# Patient Record
Sex: FEMALE | Race: WHITE | NOT HISPANIC OR LATINO | URBAN - METROPOLITAN AREA
[De-identification: names, ages, dates, MRNs, and addresses within clinical notes are randomized per-mention and may not be internally consistent; named-entity substitution may affect disease eponyms.]

---

## 2020-05-25 ENCOUNTER — EMERGENCY (EMERGENCY)
Facility: HOSPITAL | Age: 70
LOS: 0 days | Discharge: HOME | End: 2020-05-25
Attending: STUDENT IN AN ORGANIZED HEALTH CARE EDUCATION/TRAINING PROGRAM | Admitting: STUDENT IN AN ORGANIZED HEALTH CARE EDUCATION/TRAINING PROGRAM
Payer: MEDICARE

## 2020-05-25 VITALS
DIASTOLIC BLOOD PRESSURE: 79 MMHG | TEMPERATURE: 98 F | RESPIRATION RATE: 18 BRPM | SYSTOLIC BLOOD PRESSURE: 168 MMHG | HEART RATE: 65 BPM | OXYGEN SATURATION: 98 %

## 2020-05-25 DIAGNOSIS — S01.01XA LACERATION WITHOUT FOREIGN BODY OF SCALP, INITIAL ENCOUNTER: ICD-10-CM
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
## 2020-05-25 DIAGNOSIS — S09.90XA UNSPECIFIED INJURY OF HEAD, INITIAL ENCOUNTER: ICD-10-CM
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
## 2020-05-25 PROCEDURE — 12002 RPR S/N/AX/GEN/TRNK2.6-7.5CM: CPT

## 2020-05-25 PROCEDURE — 99283 EMERGENCY DEPT VISIT LOW MDM: CPT | Mod: 25

## 2020-05-25 NOTE — ED PROVIDER NOTE - PROVIDER TOKENS
FREE:[LAST:[Salvatore],FIRST:[Heriberto],PHONE:[(   )    -],FAX:[(   )    -],ESTABLISHEDPATIENT:[T]]

## 2020-05-25 NOTE — ED PROVIDER NOTE - OBJECTIVE STATEMENT
Pt is a 69 yo F no PMH presenting after she slipped on her steps and hit her head on the side of a brick wall. Denies any LOC, n/v, dizziness, blurred vision , numbness or tingling or weakness of the extremities. Not on any blood thinners. Took 2 advil prior to arrival.

## 2020-05-25 NOTE — ED PROVIDER NOTE - PATIENT PORTAL LINK FT
You can access the FollowMyHealth Patient Portal offered by [facility] by registering at the following website: [email or web address]. By joining [facility]’s FollowMyHealth portal, you will also be able to view your health information using other applications (apps) compatible with our system.

## 2020-05-25 NOTE — ED PROVIDER NOTE - NSFOLLOWUPINSTRUCTIONS_ED_ALL_ED_FT
RETURN IN 10 DAYS to REMOVE STAPLES    Laceration    A laceration is a cut that goes through all of the layers of the skin and into the tissue that is right under the skin. Some lacerations heal on their own. Others need to be closed with skin adhesive strips, skin glue, stitches (sutures), or staples. Proper laceration care minimizes the risk of infection and helps the laceration to heal better.  If non-absorbable stitches or staples have been placed, they must be taken out within the time frame instructed by your healthcare provider.    SEEK IMMEDIATE MEDICAL CARE IF YOU HAVE ANY OF THE FOLLOWING SYMPTOMS: swelling around the wound, worsening pain, drainage from the wound, red streaking going away from your wound, inability to move finger or toe near the laceration, or discoloration of skin near the laceration.

## 2020-05-25 NOTE — ED PROVIDER NOTE - CLINICAL SUMMARY MEDICAL DECISION MAKING FREE TEXT BOX
pt doing well after ED obs period, no change in mental status or neuro exam. lac repaired. stable for d/c and f/u. return precautions given.

## 2020-05-25 NOTE — ED PROVIDER NOTE - PHYSICAL EXAMINATION
CONSTITUTIONAL: Well-developed; well-nourished; in no acute distress.   SKIN: warm, dry  HEAD: + 4.5 cm laceration to the occipital scalp. No active bleeding.   EYES: PERRL, EOMI, normal sclera and conjunctiva   ENT: No nasal discharge; airway clear.  NECK: Supple; non tender.  CARD:  Regular rate and rhythm.   RESP: NO inc WOB   ABD: soft ntnd  EXT: Normal ROM.    LYMPH: No acute cervical adenopathy.  NEURO: Alert, oriented, grossly unremarkable  PSYCH: Cooperative, appropriate.

## 2021-09-03 ENCOUNTER — INPATIENT (INPATIENT)
Facility: HOSPITAL | Age: 71
LOS: 5 days | Discharge: HOME | End: 2021-09-09
Attending: INTERNAL MEDICINE | Admitting: INTERNAL MEDICINE
Payer: MEDICARE

## 2021-09-03 VITALS
TEMPERATURE: 97 F | OXYGEN SATURATION: 96 % | HEART RATE: 68 BPM | SYSTOLIC BLOOD PRESSURE: 126 MMHG | DIASTOLIC BLOOD PRESSURE: 79 MMHG | RESPIRATION RATE: 20 BRPM | WEIGHT: 189.6 LBS

## 2021-09-03 LAB
ALBUMIN SERPL ELPH-MCNC: 4.4 G/DL — SIGNIFICANT CHANGE UP (ref 3.5–5.2)
ALP SERPL-CCNC: 574 U/L — HIGH (ref 30–115)
ALT FLD-CCNC: 449 U/L — HIGH (ref 0–41)
ANION GAP SERPL CALC-SCNC: 15 MMOL/L — HIGH (ref 7–14)
APPEARANCE UR: CLEAR — SIGNIFICANT CHANGE UP
AST SERPL-CCNC: 350 U/L — HIGH (ref 0–41)
B-OH-BUTYR SERPL-SCNC: 1 MMOL/L — HIGH
BASOPHILS # BLD AUTO: 0.03 K/UL — SIGNIFICANT CHANGE UP (ref 0–0.2)
BASOPHILS NFR BLD AUTO: 0.3 % — SIGNIFICANT CHANGE UP (ref 0–1)
BILIRUB DIRECT SERPL-MCNC: 1.7 MG/DL — HIGH (ref 0–0.2)
BILIRUB INDIRECT FLD-MCNC: 1.1 MG/DL — SIGNIFICANT CHANGE UP (ref 0.2–1.2)
BILIRUB SERPL-MCNC: 2.8 MG/DL — HIGH (ref 0.2–1.2)
BILIRUB UR-MCNC: NEGATIVE — SIGNIFICANT CHANGE UP
BUN SERPL-MCNC: 18 MG/DL — SIGNIFICANT CHANGE UP (ref 10–20)
CALCIUM SERPL-MCNC: 9.8 MG/DL — SIGNIFICANT CHANGE UP (ref 8.5–10.1)
CHLORIDE SERPL-SCNC: 96 MMOL/L — LOW (ref 98–110)
CO2 SERPL-SCNC: 25 MMOL/L — SIGNIFICANT CHANGE UP (ref 17–32)
COLOR SPEC: YELLOW — SIGNIFICANT CHANGE UP
CREAT SERPL-MCNC: 0.6 MG/DL — LOW (ref 0.7–1.5)
DIFF PNL FLD: NEGATIVE — SIGNIFICANT CHANGE UP
EOSINOPHIL # BLD AUTO: 0.02 K/UL — SIGNIFICANT CHANGE UP (ref 0–0.7)
EOSINOPHIL NFR BLD AUTO: 0.2 % — SIGNIFICANT CHANGE UP (ref 0–8)
GAS PNL BLDV: SIGNIFICANT CHANGE UP
GLUCOSE SERPL-MCNC: 301 MG/DL — HIGH (ref 70–99)
GLUCOSE UR QL: ABNORMAL
HCT VFR BLD CALC: 47.5 % — HIGH (ref 37–47)
HGB BLD-MCNC: 15.6 G/DL — SIGNIFICANT CHANGE UP (ref 12–16)
IMM GRANULOCYTES NFR BLD AUTO: 0.3 % — SIGNIFICANT CHANGE UP (ref 0.1–0.3)
KETONES UR-MCNC: ABNORMAL
LACTATE SERPL-SCNC: 2.4 MMOL/L — HIGH (ref 0.7–2)
LEUKOCYTE ESTERASE UR-ACNC: NEGATIVE — SIGNIFICANT CHANGE UP
LIDOCAIN IGE QN: 28 U/L — SIGNIFICANT CHANGE UP (ref 7–60)
LYMPHOCYTES # BLD AUTO: 1.78 K/UL — SIGNIFICANT CHANGE UP (ref 1.2–3.4)
LYMPHOCYTES # BLD AUTO: 18 % — LOW (ref 20.5–51.1)
MCHC RBC-ENTMCNC: 28.9 PG — SIGNIFICANT CHANGE UP (ref 27–31)
MCHC RBC-ENTMCNC: 32.8 G/DL — SIGNIFICANT CHANGE UP (ref 32–37)
MCV RBC AUTO: 88 FL — SIGNIFICANT CHANGE UP (ref 81–99)
MONOCYTES # BLD AUTO: 0.52 K/UL — SIGNIFICANT CHANGE UP (ref 0.1–0.6)
MONOCYTES NFR BLD AUTO: 5.3 % — SIGNIFICANT CHANGE UP (ref 1.7–9.3)
NEUTROPHILS # BLD AUTO: 7.5 K/UL — HIGH (ref 1.4–6.5)
NEUTROPHILS NFR BLD AUTO: 75.9 % — HIGH (ref 42.2–75.2)
NITRITE UR-MCNC: NEGATIVE — SIGNIFICANT CHANGE UP
NRBC # BLD: 0 /100 WBCS — SIGNIFICANT CHANGE UP (ref 0–0)
PH UR: 6 — SIGNIFICANT CHANGE UP (ref 5–8)
PLATELET # BLD AUTO: 282 K/UL — SIGNIFICANT CHANGE UP (ref 130–400)
POTASSIUM SERPL-MCNC: 5.1 MMOL/L — HIGH (ref 3.5–5)
POTASSIUM SERPL-SCNC: 5.1 MMOL/L — HIGH (ref 3.5–5)
PROT SERPL-MCNC: 7.6 G/DL — SIGNIFICANT CHANGE UP (ref 6–8)
PROT UR-MCNC: SIGNIFICANT CHANGE UP
RBC # BLD: 5.4 M/UL — SIGNIFICANT CHANGE UP (ref 4.2–5.4)
RBC # FLD: 13.8 % — SIGNIFICANT CHANGE UP (ref 11.5–14.5)
SARS-COV-2 RNA SPEC QL NAA+PROBE: SIGNIFICANT CHANGE UP
SODIUM SERPL-SCNC: 136 MMOL/L — SIGNIFICANT CHANGE UP (ref 135–146)
SP GR SPEC: 1.04 — HIGH (ref 1.01–1.03)
TROPONIN T SERPL-MCNC: <0.01 NG/ML — SIGNIFICANT CHANGE UP
UROBILINOGEN FLD QL: ABNORMAL
WBC # BLD: 9.88 K/UL — SIGNIFICANT CHANGE UP (ref 4.8–10.8)
WBC # FLD AUTO: 9.88 K/UL — SIGNIFICANT CHANGE UP (ref 4.8–10.8)

## 2021-09-03 PROCEDURE — 71275 CT ANGIOGRAPHY CHEST: CPT | Mod: 26,MA

## 2021-09-03 PROCEDURE — 99291 CRITICAL CARE FIRST HOUR: CPT

## 2021-09-03 PROCEDURE — 74174 CTA ABD&PLVS W/CONTRAST: CPT | Mod: 26,MH

## 2021-09-03 PROCEDURE — 93010 ELECTROCARDIOGRAM REPORT: CPT

## 2021-09-03 RX ORDER — CALCIUM GLUCONATE 100 MG/ML
1 VIAL (ML) INTRAVENOUS ONCE
Refills: 0 | Status: COMPLETED | OUTPATIENT
Start: 2021-09-03 | End: 2021-09-03

## 2021-09-03 RX ORDER — SODIUM CHLORIDE 9 MG/ML
1000 INJECTION, SOLUTION INTRAVENOUS ONCE
Refills: 0 | Status: COMPLETED | OUTPATIENT
Start: 2021-09-03 | End: 2021-09-03

## 2021-09-03 RX ORDER — FAMOTIDINE 10 MG/ML
20 INJECTION INTRAVENOUS ONCE
Refills: 0 | Status: COMPLETED | OUTPATIENT
Start: 2021-09-03 | End: 2021-09-03

## 2021-09-03 RX ORDER — MORPHINE SULFATE 50 MG/1
4 CAPSULE, EXTENDED RELEASE ORAL ONCE
Refills: 0 | Status: DISCONTINUED | OUTPATIENT
Start: 2021-09-03 | End: 2021-09-03

## 2021-09-03 RX ORDER — SODIUM CHLORIDE 9 MG/ML
1000 INJECTION, SOLUTION INTRAVENOUS
Refills: 0 | Status: DISCONTINUED | OUTPATIENT
Start: 2021-09-03 | End: 2021-09-04

## 2021-09-03 RX ORDER — METOCLOPRAMIDE HCL 10 MG
10 TABLET ORAL ONCE
Refills: 0 | Status: COMPLETED | OUTPATIENT
Start: 2021-09-03 | End: 2021-09-03

## 2021-09-03 RX ORDER — ONDANSETRON 8 MG/1
4 TABLET, FILM COATED ORAL ONCE
Refills: 0 | Status: COMPLETED | OUTPATIENT
Start: 2021-09-03 | End: 2021-09-03

## 2021-09-03 RX ORDER — INSULIN HUMAN 100 [IU]/ML
10 INJECTION, SOLUTION SUBCUTANEOUS ONCE
Refills: 0 | Status: COMPLETED | OUTPATIENT
Start: 2021-09-03 | End: 2021-09-03

## 2021-09-03 RX ADMIN — SODIUM CHLORIDE 1000 MILLILITER(S): 9 INJECTION, SOLUTION INTRAVENOUS at 18:16

## 2021-09-03 RX ADMIN — SODIUM CHLORIDE 1000 MILLILITER(S): 9 INJECTION, SOLUTION INTRAVENOUS at 19:42

## 2021-09-03 RX ADMIN — Medication 104 MILLIGRAM(S): at 19:41

## 2021-09-03 RX ADMIN — MORPHINE SULFATE 4 MILLIGRAM(S): 50 CAPSULE, EXTENDED RELEASE ORAL at 20:37

## 2021-09-03 RX ADMIN — MORPHINE SULFATE 4 MILLIGRAM(S): 50 CAPSULE, EXTENDED RELEASE ORAL at 19:41

## 2021-09-03 RX ADMIN — Medication 100 GRAM(S): at 23:30

## 2021-09-03 RX ADMIN — MORPHINE SULFATE 4 MILLIGRAM(S): 50 CAPSULE, EXTENDED RELEASE ORAL at 20:39

## 2021-09-03 RX ADMIN — FAMOTIDINE 104 MILLIGRAM(S): 10 INJECTION INTRAVENOUS at 18:16

## 2021-09-03 RX ADMIN — SODIUM CHLORIDE 1000 MILLILITER(S): 9 INJECTION, SOLUTION INTRAVENOUS at 19:55

## 2021-09-03 RX ADMIN — Medication 1 GRAM(S): at 23:59

## 2021-09-03 RX ADMIN — SODIUM CHLORIDE 1000 MILLILITER(S): 9 INJECTION, SOLUTION INTRAVENOUS at 19:54

## 2021-09-03 RX ADMIN — Medication 10 MILLIGRAM(S): at 19:54

## 2021-09-03 RX ADMIN — ONDANSETRON 4 MILLIGRAM(S): 8 TABLET, FILM COATED ORAL at 18:16

## 2021-09-03 RX ADMIN — INSULIN HUMAN 10 UNIT(S): 100 INJECTION, SOLUTION SUBCUTANEOUS at 23:30

## 2021-09-03 RX ADMIN — FAMOTIDINE 20 MILLIGRAM(S): 10 INJECTION INTRAVENOUS at 19:39

## 2021-09-03 RX ADMIN — MORPHINE SULFATE 4 MILLIGRAM(S): 50 CAPSULE, EXTENDED RELEASE ORAL at 18:16

## 2021-09-03 RX ADMIN — SODIUM CHLORIDE 1000 MILLILITER(S): 9 INJECTION, SOLUTION INTRAVENOUS at 19:39

## 2021-09-03 NOTE — ED PROVIDER NOTE - NS ED ROS FT
Constitutional: (-) fever (-) chills (-) lightheadedness  (+) diaphroesis  Eyes/ENT: (-) blurry vision, (-) epistaxis (-) rhinorrhea (-) nasal congestion  Cardiovascular: (-) chest pain, (-) syncope (-) palpitations   Respiratory: (-) cough, (-) shortness of breath (-) pleurisy   Gastrointestinal: (+) vomiting, (-) diarrhea (+) abdominal pain (+) nausea (-) anorexia  Musculoskeletal: (-) neck pain, (-) back pain, (-) joint pain (-) joint swelling (-) painful ROM  Integumentary: (-) rash, (-) edema (-) lacerations (-) pruritis   Neurological: (-) headache, (-) altered mental status (-) LOC (-) dizziness (-) paresthesias (-) gait abnormalities

## 2021-09-03 NOTE — ED PROVIDER NOTE - ATTENDING CONTRIBUTION TO CARE
70 y/o f w/ pmhx of hypothyroidism not complaint with her synthroid, has not follow up with her physician in year, cholecystectomy many years ago, GERD, supposed to be on anti acid medication but does not take, presents with ~ 3 days of epigastric pain, acid taste in mouth at time, sharp, radiated to lower back on L side, associated with nausea and vomiting, nbnb, diaphoresis. pt has never gotten and endoscopy or colonoscopy. No fever, chills, cp,  pleuritic cp, sob, palpitations, cough, ha/lh/dizziness, numbness/tingling, neck pain/ stiffness, abd pain, constipation, melena/brbpr, urinary symptoms, vaginal bleeding/discharge/odor, trauma, weakness, edema, calf pain/swelling/erythema, sick contacts, recent travel or rash. Not a smoker, no etoh abuse or drug abuse.    On Exam: Vital Signs: I have reviewed the initial vital signs. Constitutional: Pt sitting on stretcher speaking full sentences intermittently with dry heaves. Integumentary: No rash. No jaundice. EYES: No sclera Icterus. ENT: MMM NECK: Supple, non-tender, no meningeal signs. Cardiovascular: RRR, radial pulses 2/4 b/l. No JVD. Respiratory: BS present b/l, ctabl, no wheezing or crackles, no accessory muscle use, no stridor. Gastrointestinal: BS present throughout all 4 quadrants, soft, nd, pain to palpation to epigastric region, no rebound tenderness or guarding, no cvat. (-) Tamayo's (-) Rovsing (-) Obturator (-) Psoas, Musculoskeletal: FROM, no edema, no calf pain/swelling/erythema. Neurologic: AAOx3, motor 5/5 and sensation intact throughout upper and lowe ext, CN II-XII intact, No facial droop or slurring of speech. No focal deficits.

## 2021-09-03 NOTE — ED PROVIDER NOTE - CLINICAL SUMMARY MEDICAL DECISION MAKING FREE TEXT BOX
I have fully discussed the medical management and delivery of care with the patient. I have discussed any available labs, imaging and treatment options with the patient.  Pt admitted for further care & management.  ICU team aware of pt as approved for ICU.

## 2021-09-03 NOTE — ED PROVIDER NOTE - PHYSICAL EXAMINATION
Physical Exam    Vital Signs: I have reviewed the initial vital signs.  Constitutional: well-nourished, appears stated age, no acute distress  Eyes: Conjunctiva pink, Sclera clear, PERRLA, EOMI  Cardiovascular: S1 and S2, regular rate, regular rhythm, well-perfused extremities, radial pulses equal and 2+, calves nonttp, equal in size  Respiratory: unlabored respiratory effort, speaking in full sentences, handling oral secretions, clear to auscultation bilaterally no wheezing, rales and rhonchi  Gastrointestinal: soft, TTP ipigastrium, no pulsatile mass, normal bowl sounds  Musculoskeletal: supple neck, no lower extremity edema, no midline tenderness, paraspinal tenderness, clavicular creptius, painful rom, moving all extreities appropriately, no gross bony deformities or swelling.  Integumentary: warm, dry, no rashes, lacerations,  Neurologic: awake, alert, Physical Exam    Vital Signs: I have reviewed the initial vital signs.  Constitutional: well-nourished, appears stated age, no acute distress  Eyes: Conjunctiva pink, Sclera clear, PERRLA, EOMI  Cardiovascular: S1 and S2, regular rate, regular rhythm, well-perfused extremities, radial pulses equal and 2+, calves nonttp, equal in size  Respiratory: unlabored respiratory effort, speaking in full sentences, handling oral secretions, clear to auscultation bilaterally no wheezing, rales and rhonchi  Gastrointestinal: soft, TTP epigastrium, no pulsatile mass, normal bowl sounds  Musculoskeletal: supple neck, no lower extremity edema, no midline tenderness, paraspinal tenderness, clavicular crepitus, painful rom, moving all extremities appropriately, no gross bony deformities or swelling.  Integumentary: warm, dry, no rashes, lacerations,  Neurologic: awake, alert,

## 2021-09-03 NOTE — ED ADULT NURSE REASSESSMENT NOTE - NS ED NURSE REASSESS COMMENT FT1
Assumed care from previous day shift RN c/o abdominal pain , nausea, vomiting , AO x 4 , no SOB , denies chest pain , denies abdominal pain , denies dizziness , awaiting for CT abdomen

## 2021-09-03 NOTE — ED PROVIDER NOTE - CARE PLAN
Assessment and plan of treatment:	Plan: Monitor, EKG, CXR, labs, urine, ivf, pain meds, anit emetic, pepcid, reassess.   1 Principal Discharge DX:	DKA (diabetic ketoacidosis)  Assessment and plan of treatment:	Plan: Monitor, EKG, CXR, labs, urine, ivf, pain meds, anit emetic, pepcid, reassess.

## 2021-09-03 NOTE — ED PROVIDER NOTE - PROGRESS NOTE DETAILS
ED Attending MARCO Lind  Pt and family at bedside aware of plan, pt received meds, nausea currently controlled, eg reviewed, will continue to monitor and reassess. dc: pt likely in dka although no previous hx of dm. Will treat accordingly. Pt going for CT abdomen pelvis ED Attending MARCO Lind  Pt and family at bedside aware of plan, pt received meds, nausea currently controlled, ekg reviewed, will continue to monitor and reassess. ED Attending MARCO Lind  glucose 301, ag 15, beta hydrocy 1, pt has not seen physician does not know if she has DM, fludis given, insulin ordered. pending imaging results, repeat labs, will reassess. ED Attending MARCO Lind  pt and family aware of all results, pt reports hx of gestational dm but never followed up and is not sure if she has dm as she has not seen hy physician in years, pt feeling better at this time, vomiting controlled, received bolus of insulin, k repeated after on vbg and is 4.2, Ag on repeat cmp after boluis still elevated, vbg noted, drip started, ICU fellow approved pt for ICU< ICU resident aware of pt and admission as discussed and agreed with pt. fluids being given, insulin drip to be started, repeat labs that ICU team will follow . ED Attending MARCO Lind  glucose 301, ag 15, beta hydroxy 1, pt has not seen physician does not know if she has DM, fluids given, insulin ordered. pending imaging results, repeat labs, will reassess.

## 2021-09-04 LAB
A1C WITH ESTIMATED AVERAGE GLUCOSE RESULT: 8.7 % — HIGH (ref 4–5.6)
ALBUMIN SERPL ELPH-MCNC: 4 G/DL — SIGNIFICANT CHANGE UP (ref 3.5–5.2)
ALBUMIN SERPL ELPH-MCNC: 4.1 G/DL — SIGNIFICANT CHANGE UP (ref 3.5–5.2)
ALP SERPL-CCNC: 469 U/L — HIGH (ref 30–115)
ALP SERPL-CCNC: 480 U/L — HIGH (ref 30–115)
ALT FLD-CCNC: 414 U/L — HIGH (ref 0–41)
ALT FLD-CCNC: 536 U/L — HIGH (ref 0–41)
ANION GAP SERPL CALC-SCNC: 11 MMOL/L — SIGNIFICANT CHANGE UP (ref 7–14)
ANION GAP SERPL CALC-SCNC: 13 MMOL/L — SIGNIFICANT CHANGE UP (ref 7–14)
ANION GAP SERPL CALC-SCNC: 14 MMOL/L — SIGNIFICANT CHANGE UP (ref 7–14)
ANION GAP SERPL CALC-SCNC: 21 MMOL/L — HIGH (ref 7–14)
AST SERPL-CCNC: 335 U/L — HIGH (ref 0–41)
AST SERPL-CCNC: 417 U/L — HIGH (ref 0–41)
BASE EXCESS BLDV CALC-SCNC: 0.4 MMOL/L — SIGNIFICANT CHANGE UP (ref -2–3)
BASOPHILS # BLD AUTO: 0.01 K/UL — SIGNIFICANT CHANGE UP (ref 0–0.2)
BASOPHILS NFR BLD AUTO: 0.1 % — SIGNIFICANT CHANGE UP (ref 0–1)
BILIRUB SERPL-MCNC: 3.1 MG/DL — HIGH (ref 0.2–1.2)
BILIRUB SERPL-MCNC: 3.9 MG/DL — HIGH (ref 0.2–1.2)
BUN SERPL-MCNC: 10 MG/DL — SIGNIFICANT CHANGE UP (ref 10–20)
BUN SERPL-MCNC: 10 MG/DL — SIGNIFICANT CHANGE UP (ref 10–20)
BUN SERPL-MCNC: 16 MG/DL — SIGNIFICANT CHANGE UP (ref 10–20)
BUN SERPL-MCNC: 9 MG/DL — LOW (ref 10–20)
CA-I SERPL-SCNC: 1.12 MMOL/L — LOW (ref 1.15–1.33)
CALCIUM SERPL-MCNC: 8.7 MG/DL — SIGNIFICANT CHANGE UP (ref 8.5–10.1)
CALCIUM SERPL-MCNC: 8.9 MG/DL — SIGNIFICANT CHANGE UP (ref 8.5–10.1)
CALCIUM SERPL-MCNC: 9.1 MG/DL — SIGNIFICANT CHANGE UP (ref 8.5–10.1)
CALCIUM SERPL-MCNC: 9.3 MG/DL — SIGNIFICANT CHANGE UP (ref 8.5–10.1)
CHLORIDE SERPL-SCNC: 100 MMOL/L — SIGNIFICANT CHANGE UP (ref 98–110)
CHLORIDE SERPL-SCNC: 96 MMOL/L — LOW (ref 98–110)
CHLORIDE SERPL-SCNC: 97 MMOL/L — LOW (ref 98–110)
CHLORIDE SERPL-SCNC: 99 MMOL/L — SIGNIFICANT CHANGE UP (ref 98–110)
CHOLEST SERPL-MCNC: 253 MG/DL — HIGH
CO2 SERPL-SCNC: 14 MMOL/L — LOW (ref 17–32)
CO2 SERPL-SCNC: 21 MMOL/L — SIGNIFICANT CHANGE UP (ref 17–32)
CO2 SERPL-SCNC: 25 MMOL/L — SIGNIFICANT CHANGE UP (ref 17–32)
CO2 SERPL-SCNC: 25 MMOL/L — SIGNIFICANT CHANGE UP (ref 17–32)
CREAT SERPL-MCNC: 0.5 MG/DL — LOW (ref 0.7–1.5)
CREAT SERPL-MCNC: 0.5 MG/DL — LOW (ref 0.7–1.5)
CREAT SERPL-MCNC: 0.6 MG/DL — LOW (ref 0.7–1.5)
CREAT SERPL-MCNC: 0.7 MG/DL — SIGNIFICANT CHANGE UP (ref 0.7–1.5)
EOSINOPHIL # BLD AUTO: 0 K/UL — SIGNIFICANT CHANGE UP (ref 0–0.7)
EOSINOPHIL NFR BLD AUTO: 0 % — SIGNIFICANT CHANGE UP (ref 0–8)
ESTIMATED AVERAGE GLUCOSE: 203 MG/DL — HIGH (ref 68–114)
GAS PNL BLDV: 131 MMOL/L — LOW (ref 136–145)
GAS PNL BLDV: SIGNIFICANT CHANGE UP
GLUCOSE BLDC GLUCOMTR-MCNC: 108 MG/DL — HIGH (ref 70–99)
GLUCOSE BLDC GLUCOMTR-MCNC: 121 MG/DL — HIGH (ref 70–99)
GLUCOSE BLDC GLUCOMTR-MCNC: 144 MG/DL — HIGH (ref 70–99)
GLUCOSE BLDC GLUCOMTR-MCNC: 148 MG/DL — HIGH (ref 70–99)
GLUCOSE BLDC GLUCOMTR-MCNC: 163 MG/DL — HIGH (ref 70–99)
GLUCOSE BLDC GLUCOMTR-MCNC: 173 MG/DL — HIGH (ref 70–99)
GLUCOSE BLDC GLUCOMTR-MCNC: 174 MG/DL — HIGH (ref 70–99)
GLUCOSE BLDC GLUCOMTR-MCNC: 176 MG/DL — HIGH (ref 70–99)
GLUCOSE BLDC GLUCOMTR-MCNC: 178 MG/DL — HIGH (ref 70–99)
GLUCOSE BLDC GLUCOMTR-MCNC: 184 MG/DL — HIGH (ref 70–99)
GLUCOSE BLDC GLUCOMTR-MCNC: 192 MG/DL — HIGH (ref 70–99)
GLUCOSE SERPL-MCNC: 144 MG/DL — HIGH (ref 70–99)
GLUCOSE SERPL-MCNC: 197 MG/DL — HIGH (ref 70–99)
GLUCOSE SERPL-MCNC: 198 MG/DL — HIGH (ref 70–99)
GLUCOSE SERPL-MCNC: 252 MG/DL — HIGH (ref 70–99)
HCO3 BLDV-SCNC: 28 MMOL/L — SIGNIFICANT CHANGE UP (ref 22–29)
HCT VFR BLD CALC: 43.5 % — SIGNIFICANT CHANGE UP (ref 37–47)
HCT VFR BLDA CALC: 52 % — HIGH (ref 34.5–46.5)
HDLC SERPL-MCNC: 62 MG/DL — SIGNIFICANT CHANGE UP
HGB BLD CALC-MCNC: 17.2 G/DL — HIGH (ref 11.7–16.1)
HGB BLD-MCNC: 14.5 G/DL — SIGNIFICANT CHANGE UP (ref 12–16)
IMM GRANULOCYTES NFR BLD AUTO: 0.4 % — HIGH (ref 0.1–0.3)
LACTATE BLDV-MCNC: 2.2 MMOL/L — HIGH (ref 0.5–2)
LIPID PNL WITH DIRECT LDL SERPL: 174 MG/DL — HIGH
LYMPHOCYTES # BLD AUTO: 1 K/UL — LOW (ref 1.2–3.4)
LYMPHOCYTES # BLD AUTO: 8.1 % — LOW (ref 20.5–51.1)
MAGNESIUM SERPL-MCNC: 1.9 MG/DL — SIGNIFICANT CHANGE UP (ref 1.8–2.4)
MCHC RBC-ENTMCNC: 29.4 PG — SIGNIFICANT CHANGE UP (ref 27–31)
MCHC RBC-ENTMCNC: 33.3 G/DL — SIGNIFICANT CHANGE UP (ref 32–37)
MCV RBC AUTO: 88.1 FL — SIGNIFICANT CHANGE UP (ref 81–99)
MONOCYTES # BLD AUTO: 0.92 K/UL — HIGH (ref 0.1–0.6)
MONOCYTES NFR BLD AUTO: 7.5 % — SIGNIFICANT CHANGE UP (ref 1.7–9.3)
NEUTROPHILS # BLD AUTO: 10.3 K/UL — HIGH (ref 1.4–6.5)
NEUTROPHILS NFR BLD AUTO: 83.9 % — HIGH (ref 42.2–75.2)
NON HDL CHOLESTEROL: 191 MG/DL — HIGH
NRBC # BLD: 0 /100 WBCS — SIGNIFICANT CHANGE UP (ref 0–0)
PCO2 BLDV: 56 MMHG — HIGH (ref 39–42)
PH BLDV: 7.31 — LOW (ref 7.32–7.43)
PLATELET # BLD AUTO: 213 K/UL — SIGNIFICANT CHANGE UP (ref 130–400)
PO2 BLDV: 32 MMHG — SIGNIFICANT CHANGE UP
POTASSIUM BLDV-SCNC: 4.2 MMOL/L — SIGNIFICANT CHANGE UP (ref 3.5–5.1)
POTASSIUM SERPL-MCNC: 4 MMOL/L — SIGNIFICANT CHANGE UP (ref 3.5–5)
POTASSIUM SERPL-MCNC: 4.1 MMOL/L — SIGNIFICANT CHANGE UP (ref 3.5–5)
POTASSIUM SERPL-MCNC: 4.2 MMOL/L — SIGNIFICANT CHANGE UP (ref 3.5–5)
POTASSIUM SERPL-MCNC: SIGNIFICANT CHANGE UP MMOL/L (ref 3.5–5)
POTASSIUM SERPL-SCNC: 4 MMOL/L — SIGNIFICANT CHANGE UP (ref 3.5–5)
POTASSIUM SERPL-SCNC: 4.1 MMOL/L — SIGNIFICANT CHANGE UP (ref 3.5–5)
POTASSIUM SERPL-SCNC: 4.2 MMOL/L — SIGNIFICANT CHANGE UP (ref 3.5–5)
POTASSIUM SERPL-SCNC: SIGNIFICANT CHANGE UP MMOL/L (ref 3.5–5)
PROT SERPL-MCNC: 6.6 G/DL — SIGNIFICANT CHANGE UP (ref 6–8)
PROT SERPL-MCNC: 7.2 G/DL — SIGNIFICANT CHANGE UP (ref 6–8)
RBC # BLD: 4.94 M/UL — SIGNIFICANT CHANGE UP (ref 4.2–5.4)
RBC # FLD: 13.9 % — SIGNIFICANT CHANGE UP (ref 11.5–14.5)
SAO2 % BLDV: 51 % — SIGNIFICANT CHANGE UP
SODIUM SERPL-SCNC: 131 MMOL/L — LOW (ref 135–146)
SODIUM SERPL-SCNC: 133 MMOL/L — LOW (ref 135–146)
SODIUM SERPL-SCNC: 133 MMOL/L — LOW (ref 135–146)
SODIUM SERPL-SCNC: 139 MMOL/L — SIGNIFICANT CHANGE UP (ref 135–146)
TRIGL SERPL-MCNC: 70 MG/DL — SIGNIFICANT CHANGE UP
WBC # BLD: 12.28 K/UL — HIGH (ref 4.8–10.8)
WBC # FLD AUTO: 12.28 K/UL — HIGH (ref 4.8–10.8)

## 2021-09-04 PROCEDURE — 71045 X-RAY EXAM CHEST 1 VIEW: CPT | Mod: 26

## 2021-09-04 PROCEDURE — 99222 1ST HOSP IP/OBS MODERATE 55: CPT

## 2021-09-04 RX ORDER — ONDANSETRON 8 MG/1
8 TABLET, FILM COATED ORAL EVERY 6 HOURS
Refills: 0 | Status: DISCONTINUED | OUTPATIENT
Start: 2021-09-04 | End: 2021-09-04

## 2021-09-04 RX ORDER — INSULIN HUMAN 100 [IU]/ML
3 INJECTION, SOLUTION SUBCUTANEOUS
Qty: 100 | Refills: 0 | Status: DISCONTINUED | OUTPATIENT
Start: 2021-09-04 | End: 2021-09-06

## 2021-09-04 RX ORDER — HEPARIN SODIUM 5000 [USP'U]/ML
5000 INJECTION INTRAVENOUS; SUBCUTANEOUS EVERY 12 HOURS
Refills: 0 | Status: DISCONTINUED | OUTPATIENT
Start: 2021-09-04 | End: 2021-09-08

## 2021-09-04 RX ORDER — METOCLOPRAMIDE HCL 10 MG
10 TABLET ORAL EVERY 6 HOURS
Refills: 0 | Status: DISCONTINUED | OUTPATIENT
Start: 2021-09-04 | End: 2021-09-07

## 2021-09-04 RX ORDER — GLUCAGON INJECTION, SOLUTION 0.5 MG/.1ML
1 INJECTION, SOLUTION SUBCUTANEOUS ONCE
Refills: 0 | Status: DISCONTINUED | OUTPATIENT
Start: 2021-09-04 | End: 2021-09-09

## 2021-09-04 RX ORDER — DEXTROSE 50 % IN WATER 50 %
12.5 SYRINGE (ML) INTRAVENOUS ONCE
Refills: 0 | Status: DISCONTINUED | OUTPATIENT
Start: 2021-09-04 | End: 2021-09-09

## 2021-09-04 RX ORDER — CHLORHEXIDINE GLUCONATE 213 G/1000ML
1 SOLUTION TOPICAL
Refills: 0 | Status: DISCONTINUED | OUTPATIENT
Start: 2021-09-04 | End: 2021-09-09

## 2021-09-04 RX ORDER — INSULIN LISPRO 100/ML
VIAL (ML) SUBCUTANEOUS
Refills: 0 | Status: DISCONTINUED | OUTPATIENT
Start: 2021-09-04 | End: 2021-09-09

## 2021-09-04 RX ORDER — METOCLOPRAMIDE HCL 10 MG
5 TABLET ORAL ONCE
Refills: 0 | Status: COMPLETED | OUTPATIENT
Start: 2021-09-04 | End: 2021-09-04

## 2021-09-04 RX ORDER — DEXTROSE 50 % IN WATER 50 %
25 SYRINGE (ML) INTRAVENOUS ONCE
Refills: 0 | Status: DISCONTINUED | OUTPATIENT
Start: 2021-09-04 | End: 2021-09-09

## 2021-09-04 RX ORDER — ACETAMINOPHEN 500 MG
650 TABLET ORAL EVERY 6 HOURS
Refills: 0 | Status: DISCONTINUED | OUTPATIENT
Start: 2021-09-04 | End: 2021-09-05

## 2021-09-04 RX ORDER — INSULIN GLARGINE 100 [IU]/ML
9 INJECTION, SOLUTION SUBCUTANEOUS EVERY MORNING
Refills: 0 | Status: DISCONTINUED | OUTPATIENT
Start: 2021-09-04 | End: 2021-09-09

## 2021-09-04 RX ORDER — PANTOPRAZOLE SODIUM 20 MG/1
40 TABLET, DELAYED RELEASE ORAL
Refills: 0 | Status: DISCONTINUED | OUTPATIENT
Start: 2021-09-04 | End: 2021-09-09

## 2021-09-04 RX ORDER — SODIUM CHLORIDE 9 MG/ML
1000 INJECTION, SOLUTION INTRAVENOUS
Refills: 0 | Status: DISCONTINUED | OUTPATIENT
Start: 2021-09-04 | End: 2021-09-04

## 2021-09-04 RX ORDER — ONDANSETRON 8 MG/1
8 TABLET, FILM COATED ORAL ONCE
Refills: 0 | Status: COMPLETED | OUTPATIENT
Start: 2021-09-04 | End: 2021-09-04

## 2021-09-04 RX ORDER — INSULIN LISPRO 100/ML
3 VIAL (ML) SUBCUTANEOUS
Refills: 0 | Status: DISCONTINUED | OUTPATIENT
Start: 2021-09-04 | End: 2021-09-09

## 2021-09-04 RX ORDER — DEXTROSE MONOHYDRATE, SODIUM CHLORIDE, AND POTASSIUM CHLORIDE 50; .745; 4.5 G/1000ML; G/1000ML; G/1000ML
1000 INJECTION, SOLUTION INTRAVENOUS
Refills: 0 | Status: DISCONTINUED | OUTPATIENT
Start: 2021-09-04 | End: 2021-09-04

## 2021-09-04 RX ORDER — DEXTROSE 50 % IN WATER 50 %
15 SYRINGE (ML) INTRAVENOUS ONCE
Refills: 0 | Status: DISCONTINUED | OUTPATIENT
Start: 2021-09-04 | End: 2021-09-09

## 2021-09-04 RX ORDER — SODIUM CHLORIDE 9 MG/ML
1000 INJECTION, SOLUTION INTRAVENOUS
Refills: 0 | Status: DISCONTINUED | OUTPATIENT
Start: 2021-09-04 | End: 2021-09-09

## 2021-09-04 RX ADMIN — HEPARIN SODIUM 5000 UNIT(S): 5000 INJECTION INTRAVENOUS; SUBCUTANEOUS at 06:00

## 2021-09-04 RX ADMIN — Medication 3 UNIT(S): at 16:23

## 2021-09-04 RX ADMIN — Medication 650 MILLIGRAM(S): at 22:50

## 2021-09-04 RX ADMIN — INSULIN HUMAN 3 UNIT(S)/HR: 100 INJECTION, SOLUTION SUBCUTANEOUS at 07:00

## 2021-09-04 RX ADMIN — Medication 650 MILLIGRAM(S): at 09:39

## 2021-09-04 RX ADMIN — Medication 650 MILLIGRAM(S): at 10:44

## 2021-09-04 RX ADMIN — HEPARIN SODIUM 5000 UNIT(S): 5000 INJECTION INTRAVENOUS; SUBCUTANEOUS at 18:05

## 2021-09-04 RX ADMIN — Medication 1: at 11:08

## 2021-09-04 RX ADMIN — INSULIN GLARGINE 9 UNIT(S): 100 INJECTION, SOLUTION SUBCUTANEOUS at 09:38

## 2021-09-04 RX ADMIN — ONDANSETRON 108 MILLIGRAM(S): 8 TABLET, FILM COATED ORAL at 23:39

## 2021-09-04 RX ADMIN — INSULIN HUMAN 9 UNIT(S)/HR: 100 INJECTION, SOLUTION SUBCUTANEOUS at 03:45

## 2021-09-04 RX ADMIN — Medication 5 MILLIGRAM(S): at 12:40

## 2021-09-04 RX ADMIN — SODIUM CHLORIDE 150 MILLILITER(S): 9 INJECTION, SOLUTION INTRAVENOUS at 07:06

## 2021-09-04 RX ADMIN — Medication 3 UNIT(S): at 11:09

## 2021-09-04 RX ADMIN — DEXTROSE MONOHYDRATE, SODIUM CHLORIDE, AND POTASSIUM CHLORIDE 100 MILLILITER(S): 50; .745; 4.5 INJECTION, SOLUTION INTRAVENOUS at 03:45

## 2021-09-04 RX ADMIN — PANTOPRAZOLE SODIUM 40 MILLIGRAM(S): 20 TABLET, DELAYED RELEASE ORAL at 06:04

## 2021-09-04 NOTE — ED ADULT NURSE REASSESSMENT NOTE - NS ED NURSE REASSESS COMMENT FT1
POCT blood glucose 173 , called and informed ICU resident , per MD , titrate insulin drip to 6 units  per verbal order .Decreased insulin drip to 6 ml as ordered by MD. Pt  no SOB , no grimaced face noted , no vomiting noted , resting comfortably on bed

## 2021-09-04 NOTE — H&P ADULT - ASSESSMENT
70 y/o f w/ pmhx of hypothyroidism not complaint with her synthroid, has not follow up with her physician in year, GERD presented with epigastric pain and acid taste in mouth for last 3 days. The abdominal pain was sharp, radiated to lower back on L side. Pain was associated with nausea and vomiting, nbnb, diaphoresis. She denied any  fever, chills, cp,  pleuritic cp, sob, palpitations, cough, ha/lh/dizziness, numbness/tingling, neck pain/ stiffness, abd pain, constipation, melena/brbpr,  sick contacts, recent travel or rash. Patient is non compliant with any health or screening measures and has not been to her  PMD in a decade.   She was received vitally stable in ER. Blood work was consistent with hyperglycemia and elevated ketone bodies. A presumptive DKA diagnosis secondary to unknown DM was made in ER and patient received an insulin IV bolus. She was started on insulin gtt and referred to ICU for admission.     ASSESSMENT:  #Abdominal pain due to DKA secondary to undiagnosed DM s/p IV insulin bolus, morphine, pepcid in ER  Hypothyroidism  Hyponatremia  Glucosuria    Plan:  will start on insulin gtt and monitor AG  will start on hydration (NS to address hyponatremia) with potassium repletion  f/u am labs, cbc, cmp, A1c, lipid profile, urine protein: creatinine ratio  NPO for now  will transition to basal bolus regimen when blood glucose will be lowered with closure of anion gap  will need outpatient PMD, ophth, podiatry and endocrine referral set up  identify and correct possible barriers to care and compliance      #GERD:  C/w PPI    #Hypothyroidism:  check TSH to assess need of medication, pt has been non compliant for years.    DVT PPX  GI PPX  CHG  FULL CODE  NPO FOR NOW

## 2021-09-04 NOTE — H&P ADULT - HISTORY OF PRESENT ILLNESS
70 y/o f w/ pmhx of hypothyroidism not complaint with her synthroid, has not follow up with her physician in year, GERD presented with epigastric pain and acid taste in mouth for last 3 days. The abdominal pain was sharp, radiated to lower back on L side. Pain was associated with nausea and vomiting, nbnb, diaphoresis. She denied any  fever, chills, cp,  pleuritic cp, sob, palpitations, cough, ha/lh/dizziness, numbness/tingling, neck pain/ stiffness, abd pain, constipation, melena/brbpr,  sick contacts, recent travel or rash. Patient is non compliant with any health or screening measures and has not been to her  PMD in a decade.     ICU Vital Signs Last 24 Hrs  T(C): 36.2 (03 Sep 2021 17:20), Max: 36.2 (03 Sep 2021 17:20)  T(F): 97.1 (03 Sep 2021 17:20), Max: 97.1 (03 Sep 2021 17:20)  HR: 68 (03 Sep 2021 17:20) (68 - 68)  BP: 126/79 (03 Sep 2021 17:20) (126/79 - 126/79)  RR: 20 (03 Sep 2021 17:20) (20 - 20)  SpO2: 96% (03 Sep 2021 17:20) (96% - 96%)    She was received vitally stable in ER. Blood work was consistent with hyperglycemia and elevated ketone bodies. A presumptive DKA diagnosis secondary to unknown DM was made in ER and patient received an insulin IV bolus. She was started on insulin gtt and referred to ICU for admission.

## 2021-09-04 NOTE — PATIENT PROFILE ADULT - VISION (WITH CORRECTIVE LENSES IF THE PATIENT USUALLY WEARS THEM):
pt has contact lens in place , states always wears them/Partially impaired: cannot see medication labels or newsprint, but can see obstacles in path, and the surrounding layout; can count fingers at arm's length

## 2021-09-04 NOTE — CHART NOTE - NSCHARTNOTEFT_GEN_A_CORE
ICU DOWNGRADE NOTE:    71y Female transferred to floor from ICU    Patient is a 71y old Female who presents with a chief complaint of abdominal pain, nausea    The patient is currently admitted for the primary diagnosis of DKA (diabetic ketoacidosis)      The patient was admitted to the unit for (1) Days.    The patient was never intubated and was never    Indwelling vascular catheters: peripehral IV     Urinary Catheter:  None    Disposition: floor    Code Status: full    ICU COURSE OF EVENTS: 70 y/o f w/ pmhx of hypothyroidism not complaint with her synthroid, has not follow up with her physician in year, GERD presented with epigastric pain and acid taste in mouth for last 3 days. The abdominal pain was sharp, radiated to lower back on L side. Pain was associated with nausea and vomiting, nbnb, diaphoresis. Patient is non compliant with any health or screening measures and has not been to her PMD in a decade. Admitted to ICU with HAGMA sec to DKA. Patient was started on Insulin drip and switched to subq insulin. Downgraded to floor as AG closed.     -------------------------------------------------------------------------------------------  ASSESSMENT  AND PLAN:     #Abdominal pain due to DKA secondary to undiagnosed DM   - on sub q insulin now  - fu endocrine consult  - fu A1c  - CC diet  - Nutrition consult    #GERD:  C/w PPI    #Hypothyroidism:  - fu TSH    -------------------------------------------------------------------------------------------    Current workup in progress:  - fu endo  - fu RUQ US  - fu BMP, A1c    SIGN OUT AT 09-04-21 @ 12:28 GIVEN TO: ICU DOWNGRADE NOTE:    71y Female transferred to floor from ICU    Patient is a 71y old Female who presents with a chief complaint of abdominal pain, nausea    The patient is currently admitted for the primary diagnosis of DKA (diabetic ketoacidosis)      The patient was admitted to the unit for (1) Days.    The patient was never intubated and was never    Indwelling vascular catheters: peripehral IV     Urinary Catheter:  None    Disposition: floor    Code Status: full    ICU COURSE OF EVENTS: 70 y/o f w/ pmhx of hypothyroidism not complaint with her synthroid, has not follow up with her physician in year, GERD presented with epigastric pain and acid taste in mouth for last 3 days. The abdominal pain was sharp, radiated to lower back on L side. Pain was associated with nausea and vomiting, nbnb, diaphoresis. Patient is non compliant with any health or screening measures and has not been to her PMD in a decade. Admitted to ICU with HAGMA sec to DKA. Patient was started on Insulin drip and switched to subq insulin. Downgraded to floor as AG closed.     -------------------------------------------------------------------------------------------  ASSESSMENT  AND PLAN:     #Abdominal pain due to DKA secondary to undiagnosed DM   - on sub q insulin now  - fu endocrine consult  - fu A1c  - CC diet  - Nutrition consult    #GERD:  C/w PPI    #Hypothyroidism:  - fu TSH    -------------------------------------------------------------------------------------------    Current workup in progress:  - fu endo  - fu RUQ US  - fu BMP, A1c    SIGN OUT AT 09-04-21 @ 12:28 GIVEN TO:  [id number]

## 2021-09-04 NOTE — ED ADULT NURSE REASSESSMENT NOTE - NS ED NURSE REASSESS COMMENT FT1
For ICU admission , awaiting for ICU bed , AO x 4 , denies any pain , IVL x 2 intact , insulin infusing , denies chest pain , AO x 4 , no SOB , denies abdominal pain , no vomiting noted , MD updated with latest POCT blood glucose and insulin titrated as ordered , endorsed to new primary day shift RN , awaiting for ICU bed

## 2021-09-04 NOTE — ED ADULT NURSE REASSESSMENT NOTE - NS ED NURSE REASSESS COMMENT FT1
Called pharmacy again for insulin drip , pt very difficult IV stick , needs ultrasound guided IV, needs 2 peripheral IV to start insulin drip

## 2021-09-04 NOTE — H&P ADULT - NSHPPHYSICALEXAM_GEN_ALL_CORE
PHYSICAL EXAM:  GENERAL: NAD, well-developed  HEAD:  Atraumatic, Normocephalic  EYES: EOMI, PERRLA, conjunctiva and sclera clear  NECK: Supple, No JVD  CHEST/LUNG: Clear to auscultation bilaterally; No wheeze  HEART: Regular rate and rhythm; No murmurs, rubs, or gallops  ABDOMEN: Soft, tender, Nondistended; Bowel sounds present  EXTREMITIES:  2+ Peripheral Pulses, No clubbing, cyanosis, or edema  PSYCH: AAOx3  NEUROLOGY: non-focal  SKIN: No rashes or lesions

## 2021-09-04 NOTE — H&P ADULT - NSHPLABSRESULTS_GEN_ALL_CORE
.  LABS:                         15.6   9.88  )-----------( 282      ( 03 Sep 2021 18:17 )             47.5         131<L>  |  96<L>  |  16  ----------------------------<  252<H>  TNP   |  14<L>  |  0.7    Ca    8.9      03 Sep 2021 22:51    TPro  7.2  /  Alb  4.1  /  TBili  3.1<H>  /  DBili  x   /  AST  335<H>  /  ALT  414<H>  /  AlkPhos  469<H>        Urinalysis Basic - ( 03 Sep 2021 20:28 )    Color: Yellow / Appearance: Clear / S.041 / pH: x  Gluc: x / Ketone: Large  / Bili: Negative / Urobili: 6 mg/dL   Blood: x / Protein: Trace / Nitrite: Negative   Leuk Esterase: Negative / RBC: x / WBC x   Sq Epi: x / Non Sq Epi: x / Bacteria: x        Lactate, Blood: 2.4 mmol/L ( @ 18:17)      RADIOLOGY, EKG & ADDITIONAL TESTS: Reviewed.

## 2021-09-04 NOTE — H&P ADULT - ATTENDING COMMENTS
Impression    DKA resolved.  Non compliant with therapy   Elevated LFT     Plan   FU FS.  Transition to SQ insulin  Reglan PRN  RUQ sono  NO statin  Endo eval   FU cultures   Feeding  Dimer

## 2021-09-05 LAB
ALBUMIN SERPL ELPH-MCNC: 4.1 G/DL — SIGNIFICANT CHANGE UP (ref 3.5–5.2)
ALBUMIN SERPL ELPH-MCNC: 4.2 G/DL — SIGNIFICANT CHANGE UP (ref 3.5–5.2)
ALP SERPL-CCNC: 441 U/L — HIGH (ref 30–115)
ALP SERPL-CCNC: 446 U/L — HIGH (ref 30–115)
ALT FLD-CCNC: 583 U/L — HIGH (ref 0–41)
ALT FLD-CCNC: 608 U/L — HIGH (ref 0–41)
ANION GAP SERPL CALC-SCNC: 13 MMOL/L — SIGNIFICANT CHANGE UP (ref 7–14)
ANION GAP SERPL CALC-SCNC: 14 MMOL/L — SIGNIFICANT CHANGE UP (ref 7–14)
AST SERPL-CCNC: 336 U/L — HIGH (ref 0–41)
AST SERPL-CCNC: 348 U/L — HIGH (ref 0–41)
BASOPHILS # BLD AUTO: 0 K/UL — SIGNIFICANT CHANGE UP (ref 0–0.2)
BASOPHILS NFR BLD AUTO: 0 % — SIGNIFICANT CHANGE UP (ref 0–1)
BILIRUB DIRECT SERPL-MCNC: 6.2 MG/DL — HIGH (ref 0–0.2)
BILIRUB INDIRECT FLD-MCNC: 1.1 MG/DL — SIGNIFICANT CHANGE UP (ref 0.2–1.2)
BILIRUB SERPL-MCNC: 7.3 MG/DL — HIGH (ref 0.2–1.2)
BILIRUB SERPL-MCNC: 7.4 MG/DL — HIGH (ref 0.2–1.2)
BUN SERPL-MCNC: 10 MG/DL — SIGNIFICANT CHANGE UP (ref 10–20)
BUN SERPL-MCNC: 10 MG/DL — SIGNIFICANT CHANGE UP (ref 10–20)
CALCIUM SERPL-MCNC: 9 MG/DL — SIGNIFICANT CHANGE UP (ref 8.5–10.1)
CALCIUM SERPL-MCNC: 9.2 MG/DL — SIGNIFICANT CHANGE UP (ref 8.5–10.1)
CHLORIDE SERPL-SCNC: 97 MMOL/L — LOW (ref 98–110)
CHLORIDE SERPL-SCNC: 98 MMOL/L — SIGNIFICANT CHANGE UP (ref 98–110)
CHOLEST SERPL-MCNC: 244 MG/DL — HIGH
CO2 SERPL-SCNC: 25 MMOL/L — SIGNIFICANT CHANGE UP (ref 17–32)
CO2 SERPL-SCNC: 26 MMOL/L — SIGNIFICANT CHANGE UP (ref 17–32)
CREAT SERPL-MCNC: 0.6 MG/DL — LOW (ref 0.7–1.5)
CREAT SERPL-MCNC: 0.6 MG/DL — LOW (ref 0.7–1.5)
CULTURE RESULTS: SIGNIFICANT CHANGE UP
EOSINOPHIL # BLD AUTO: 0 K/UL — SIGNIFICANT CHANGE UP (ref 0–0.7)
EOSINOPHIL NFR BLD AUTO: 0 % — SIGNIFICANT CHANGE UP (ref 0–8)
GLUCOSE BLDC GLUCOMTR-MCNC: 129 MG/DL — HIGH (ref 70–99)
GLUCOSE BLDC GLUCOMTR-MCNC: 131 MG/DL — HIGH (ref 70–99)
GLUCOSE BLDC GLUCOMTR-MCNC: 155 MG/DL — HIGH (ref 70–99)
GLUCOSE BLDC GLUCOMTR-MCNC: 202 MG/DL — HIGH (ref 70–99)
GLUCOSE SERPL-MCNC: 164 MG/DL — HIGH (ref 70–99)
GLUCOSE SERPL-MCNC: 205 MG/DL — HIGH (ref 70–99)
HCT VFR BLD CALC: 42.8 % — SIGNIFICANT CHANGE UP (ref 37–47)
HCT VFR BLD CALC: 44.4 % — SIGNIFICANT CHANGE UP (ref 37–47)
HDLC SERPL-MCNC: 56 MG/DL — SIGNIFICANT CHANGE UP
HGB BLD-MCNC: 14.3 G/DL — SIGNIFICANT CHANGE UP (ref 12–16)
HGB BLD-MCNC: 14.9 G/DL — SIGNIFICANT CHANGE UP (ref 12–16)
IMM GRANULOCYTES NFR BLD AUTO: 0.6 % — HIGH (ref 0.1–0.3)
IRON SATN MFR SERPL: 29 % — SIGNIFICANT CHANGE UP (ref 15–50)
IRON SATN MFR SERPL: 75 UG/DL — SIGNIFICANT CHANGE UP (ref 35–150)
LDH SERPL L TO P-CCNC: 352 — HIGH (ref 50–242)
LIPID PNL WITH DIRECT LDL SERPL: 163 MG/DL — HIGH
LYMPHOCYTES # BLD AUTO: 0.93 K/UL — LOW (ref 1.2–3.4)
LYMPHOCYTES # BLD AUTO: 11.3 % — LOW (ref 20.5–51.1)
MAGNESIUM SERPL-MCNC: 2 MG/DL — SIGNIFICANT CHANGE UP (ref 1.8–2.4)
MCHC RBC-ENTMCNC: 29.2 PG — SIGNIFICANT CHANGE UP (ref 27–31)
MCHC RBC-ENTMCNC: 29.4 PG — SIGNIFICANT CHANGE UP (ref 27–31)
MCHC RBC-ENTMCNC: 33.4 G/DL — SIGNIFICANT CHANGE UP (ref 32–37)
MCHC RBC-ENTMCNC: 33.6 G/DL — SIGNIFICANT CHANGE UP (ref 32–37)
MCV RBC AUTO: 86.9 FL — SIGNIFICANT CHANGE UP (ref 81–99)
MCV RBC AUTO: 87.9 FL — SIGNIFICANT CHANGE UP (ref 81–99)
MONOCYTES # BLD AUTO: 0.33 K/UL — SIGNIFICANT CHANGE UP (ref 0.1–0.6)
MONOCYTES NFR BLD AUTO: 4 % — SIGNIFICANT CHANGE UP (ref 1.7–9.3)
NEUTROPHILS # BLD AUTO: 6.9 K/UL — HIGH (ref 1.4–6.5)
NEUTROPHILS NFR BLD AUTO: 84.1 % — HIGH (ref 42.2–75.2)
NON HDL CHOLESTEROL: 188 MG/DL — HIGH
NRBC # BLD: 0 /100 WBCS — SIGNIFICANT CHANGE UP (ref 0–0)
NRBC # BLD: 0 /100 WBCS — SIGNIFICANT CHANGE UP (ref 0–0)
PLATELET # BLD AUTO: 205 K/UL — SIGNIFICANT CHANGE UP (ref 130–400)
PLATELET # BLD AUTO: 237 K/UL — SIGNIFICANT CHANGE UP (ref 130–400)
POTASSIUM SERPL-MCNC: 4 MMOL/L — SIGNIFICANT CHANGE UP (ref 3.5–5)
POTASSIUM SERPL-MCNC: 4.9 MMOL/L — SIGNIFICANT CHANGE UP (ref 3.5–5)
POTASSIUM SERPL-SCNC: 4 MMOL/L — SIGNIFICANT CHANGE UP (ref 3.5–5)
POTASSIUM SERPL-SCNC: 4.9 MMOL/L — SIGNIFICANT CHANGE UP (ref 3.5–5)
PROT ?TM UR-MCNC: 16 MG/DLG/24H — SIGNIFICANT CHANGE UP
PROT SERPL-MCNC: 6.8 G/DL — SIGNIFICANT CHANGE UP (ref 6–8)
PROT SERPL-MCNC: 6.9 G/DL — SIGNIFICANT CHANGE UP (ref 6–8)
RBC # BLD: 4.87 M/UL — SIGNIFICANT CHANGE UP (ref 4.2–5.4)
RBC # BLD: 5.11 M/UL — SIGNIFICANT CHANGE UP (ref 4.2–5.4)
RBC # FLD: 14.1 % — SIGNIFICANT CHANGE UP (ref 11.5–14.5)
RBC # FLD: 14.2 % — SIGNIFICANT CHANGE UP (ref 11.5–14.5)
SODIUM SERPL-SCNC: 136 MMOL/L — SIGNIFICANT CHANGE UP (ref 135–146)
SODIUM SERPL-SCNC: 137 MMOL/L — SIGNIFICANT CHANGE UP (ref 135–146)
SPECIMEN SOURCE: SIGNIFICANT CHANGE UP
TIBC SERPL-MCNC: 261 UG/DL — SIGNIFICANT CHANGE UP (ref 220–430)
TRIGL SERPL-MCNC: 83 MG/DL — SIGNIFICANT CHANGE UP
UIBC SERPL-MCNC: 186 UG/DL — SIGNIFICANT CHANGE UP (ref 110–370)
WBC # BLD: 8.21 K/UL — SIGNIFICANT CHANGE UP (ref 4.8–10.8)
WBC # BLD: 8.95 K/UL — SIGNIFICANT CHANGE UP (ref 4.8–10.8)
WBC # FLD AUTO: 8.21 K/UL — SIGNIFICANT CHANGE UP (ref 4.8–10.8)
WBC # FLD AUTO: 8.95 K/UL — SIGNIFICANT CHANGE UP (ref 4.8–10.8)

## 2021-09-05 PROCEDURE — 76705 ECHO EXAM OF ABDOMEN: CPT | Mod: 26

## 2021-09-05 PROCEDURE — 99233 SBSQ HOSP IP/OBS HIGH 50: CPT

## 2021-09-05 PROCEDURE — 99222 1ST HOSP IP/OBS MODERATE 55: CPT

## 2021-09-05 RX ORDER — LISINOPRIL 2.5 MG/1
10 TABLET ORAL DAILY
Refills: 0 | Status: DISCONTINUED | OUTPATIENT
Start: 2021-09-05 | End: 2021-09-07

## 2021-09-05 RX ORDER — LABETALOL HCL 100 MG
100 TABLET ORAL ONCE
Refills: 0 | Status: COMPLETED | OUTPATIENT
Start: 2021-09-05 | End: 2021-09-05

## 2021-09-05 RX ORDER — ATORVASTATIN CALCIUM 80 MG/1
40 TABLET, FILM COATED ORAL AT BEDTIME
Refills: 0 | Status: DISCONTINUED | OUTPATIENT
Start: 2021-09-05 | End: 2021-09-06

## 2021-09-05 RX ORDER — ASPIRIN/CALCIUM CARB/MAGNESIUM 324 MG
81 TABLET ORAL DAILY
Refills: 0 | Status: DISCONTINUED | OUTPATIENT
Start: 2021-09-05 | End: 2021-09-09

## 2021-09-05 RX ORDER — ONDANSETRON 8 MG/1
4 TABLET, FILM COATED ORAL ONCE
Refills: 0 | Status: COMPLETED | OUTPATIENT
Start: 2021-09-05 | End: 2021-09-05

## 2021-09-05 RX ORDER — TRAMADOL HYDROCHLORIDE 50 MG/1
25 TABLET ORAL
Refills: 0 | Status: DISCONTINUED | OUTPATIENT
Start: 2021-09-05 | End: 2021-09-09

## 2021-09-05 RX ADMIN — PANTOPRAZOLE SODIUM 40 MILLIGRAM(S): 20 TABLET, DELAYED RELEASE ORAL at 07:28

## 2021-09-05 RX ADMIN — Medication 650 MILLIGRAM(S): at 01:48

## 2021-09-05 RX ADMIN — Medication 100 MILLIGRAM(S): at 02:03

## 2021-09-05 RX ADMIN — HEPARIN SODIUM 5000 UNIT(S): 5000 INJECTION INTRAVENOUS; SUBCUTANEOUS at 06:39

## 2021-09-05 RX ADMIN — ATORVASTATIN CALCIUM 40 MILLIGRAM(S): 80 TABLET, FILM COATED ORAL at 21:12

## 2021-09-05 RX ADMIN — TRAMADOL HYDROCHLORIDE 25 MILLIGRAM(S): 50 TABLET ORAL at 17:58

## 2021-09-05 RX ADMIN — TRAMADOL HYDROCHLORIDE 25 MILLIGRAM(S): 50 TABLET ORAL at 21:16

## 2021-09-05 RX ADMIN — Medication 650 MILLIGRAM(S): at 08:30

## 2021-09-05 RX ADMIN — Medication 10 MILLIGRAM(S): at 21:16

## 2021-09-05 RX ADMIN — Medication 3 UNIT(S): at 17:03

## 2021-09-05 RX ADMIN — Medication 2: at 07:29

## 2021-09-05 RX ADMIN — ONDANSETRON 4 MILLIGRAM(S): 8 TABLET, FILM COATED ORAL at 14:20

## 2021-09-05 RX ADMIN — Medication 650 MILLIGRAM(S): at 07:37

## 2021-09-05 RX ADMIN — Medication 3 UNIT(S): at 07:29

## 2021-09-05 RX ADMIN — Medication 81 MILLIGRAM(S): at 11:32

## 2021-09-05 RX ADMIN — TRAMADOL HYDROCHLORIDE 25 MILLIGRAM(S): 50 TABLET ORAL at 14:14

## 2021-09-05 RX ADMIN — ONDANSETRON 4 MILLIGRAM(S): 8 TABLET, FILM COATED ORAL at 10:00

## 2021-09-05 RX ADMIN — INSULIN GLARGINE 9 UNIT(S): 100 INJECTION, SOLUTION SUBCUTANEOUS at 07:28

## 2021-09-05 RX ADMIN — Medication 3 UNIT(S): at 11:31

## 2021-09-05 RX ADMIN — TRAMADOL HYDROCHLORIDE 25 MILLIGRAM(S): 50 TABLET ORAL at 15:21

## 2021-09-05 RX ADMIN — CHLORHEXIDINE GLUCONATE 1 APPLICATION(S): 213 SOLUTION TOPICAL at 11:20

## 2021-09-05 RX ADMIN — Medication 1: at 11:31

## 2021-09-05 RX ADMIN — HEPARIN SODIUM 5000 UNIT(S): 5000 INJECTION INTRAVENOUS; SUBCUTANEOUS at 17:50

## 2021-09-05 RX ADMIN — TRAMADOL HYDROCHLORIDE 25 MILLIGRAM(S): 50 TABLET ORAL at 22:15

## 2021-09-05 RX ADMIN — LISINOPRIL 10 MILLIGRAM(S): 2.5 TABLET ORAL at 11:31

## 2021-09-05 NOTE — PROGRESS NOTE ADULT - SUBJECTIVE AND OBJECTIVE BOX
RUBIN TABOR  71y  Female      Patient is a 71y old  Female who presents with a chief complaint of DKA (04 Sep 2021 01:53)      INTERVAL HPI/OVERNIGHT EVENTS: downgraded from ICU yesterday. no acute events overnight. no major complaints.      REVIEW OF SYSTEMS:  CONSTITUTIONAL: No fever, weight loss, or fatigue  RESPIRATORY: No cough, wheezing, chills or hemoptysis; No shortness of breath  CARDIOVASCULAR: No chest pain, palpitations, dizziness, or leg swelling  GASTROINTESTINAL: No abdominal or epigastric pain. No nausea, vomiting, or hematemesis; No diarrhea or constipation. No melena or hematochezia.  GENITOURINARY: No dysuria, frequency, hematuria, or incontinence  NEUROLOGICAL: No headaches, memory loss, loss of strength, numbness, or tremors  SKIN: No itching, burning, rashes, or lesions   MUSCULOSKELETAL: No joint pain or swelling; No muscle, back, or extremity pain  PSYCHIATRIC: No depression, anxiety, mood swings, or difficulty sleeping  All other review of systems negative    VITALS  T(C): 36.6 (21 @ 05:12), Max: 36.9 (21 @ 09:15)  HR: 59 (21 @ 05:12) (59 - 68)  BP: 173/76 (21 @ 05:12) (130/68 - 210/81)  RR: 18 (21 @ 05:12) (18 - 19)  SpO2: 93% (21 @ 01:58) (93% - 96%)  Wt(kg): --Vital Signs Last 24 Hrs  T(C): 36.6 (05 Sep 2021 05:12), Max: 36.9 (04 Sep 2021 09:15)  T(F): 97.9 (05 Sep 2021 05:12), Max: 98.5 (04 Sep 2021 09:15)  HR: 59 (05 Sep 2021 05:12) (59 - 68)  BP: 173/76 (05 Sep 2021 05:12) (130/68 - 210/81)  BP(mean): --  RR: 18 (05 Sep 2021 05:12) (18 - 19)  SpO2: 93% (05 Sep 2021 01:58) (93% - 96%)      PHYSICAL EXAM:  GENERAL: elderly F, NAD, non toxic appearing  EYES: anicteric sclera, non injected conjunctiva, EOMI  ENT: hearing grossly intact, oropharynx clear, MMM  PSYCH: no agitation, baseline mentation  NERVOUS SYSTEM:  Alert & Oriented X3, CN 2-12 grossly intact  PULMONARY: Clear to percussion bilaterally; No rales, rhonchi, wheezing, or rubs  CARDIOVASCULAR: Regular rate and rhythm; No murmurs, rubs, or gallops, no LE edema  GI: Soft, Nontender, Nondistended; Bowel sounds present  EXTREMITIES:  2+ Peripheral Pulses, No clubbing, cyanosis  LYMPH: No lymphadenopathy noted  SKIN: No rashes or lesions    Consultant(s) Notes Reviewed:  [x ] YES  [ ] NO    Discussed with Consultants/Other Providers [ x] YES     LABS                          14.5    )-----------( 213      ( 04 Sep 2021 03:49 )             43.5         133<L>  |  99  |  9<L>  ----------------------------<  198<H>  4.0   |  21  |  0.5<L>    Ca    8.7      04 Sep 2021 11:41  Mg     1.9         TPro  6.6  /  Alb  4.0  /  TBili  3.9<H>  /  DBili  x   /  AST  417<H>  /  ALT  536<H>  /  AlkPhos  480<H>        Urinalysis Basic - ( 03 Sep 2021 20:28 )    Color: Yellow / Appearance: Clear / S.041 / pH: x  Gluc: x / Ketone: Large  / Bili: Negative / Urobili: 6 mg/dL   Blood: x / Protein: Trace / Nitrite: Negative   Leuk Esterase: Negative / RBC: x / WBC x   Sq Epi: x / Non Sq Epi: x / Bacteria: x    Lactate Trend   @ 18:17 Lactate:2.4     CARDIAC MARKERS ( 03 Sep 2021 18:17 )  x     / <0.01 ng/mL / x     / x     / x        POCT Blood Glucose.: 202 mg/dL (05 Sep 2021 07:18)      RADIOLOGY & ADDITIONAL TESTS:  Xray Chest 1 View AP/PA (21 @ 00:47) >  Impression:  Cardiomegaly. Bilateral opacities     CT Angio Chest Aorta w/wo IV Cont (21 @ 22:53) >  IMPRESSION:  No CT evidence of aortic dissection or intramural hematoma.  No CT evidence of acute intrathoracic or intra-abdominal pathology.  Mild atherosclerotic changes of the distal aorta.  2 mm right lung middle lobe nodule. In low riskpatients, no routine follow-up required. In high-risk patients optional CT at 12 months is recommended.      MEDICATIONS  (STANDING):  chlorhexidine 4% Liquid 1 Application(s) Topical <User Schedule>  dextrose 40% Gel 15 Gram(s) Oral once  dextrose 5%. 1000 milliLiter(s) (50 mL/Hr) IV Continuous <Continuous>  dextrose 5%. 1000 milliLiter(s) (100 mL/Hr) IV Continuous <Continuous>  dextrose 50% Injectable 25 Gram(s) IV Push once  dextrose 50% Injectable 12.5 Gram(s) IV Push once  dextrose 50% Injectable 25 Gram(s) IV Push once  glucagon  Injectable 1 milliGRAM(s) IntraMuscular once  heparin   Injectable 5000 Unit(s) SubCutaneous every 12 hours  insulin glargine Injectable (LANTUS) 9 Unit(s) SubCutaneous every morning  insulin lispro (ADMELOG) corrective regimen sliding scale   SubCutaneous three times a day before meals  insulin lispro Injectable (ADMELOG) 3 Unit(s) SubCutaneous three times a day before meals  insulin regular Infusion 3 Unit(s)/Hr (3 mL/Hr) IV Continuous <Continuous>  pantoprazole    Tablet 40 milliGRAM(s) Oral before breakfast    MEDICATIONS  (PRN):  acetaminophen   Tablet .. 650 milliGRAM(s) Oral every 6 hours PRN Mild Pain (1 - 3)  metoclopramide 10 milliGRAM(s) Oral every 6 hours PRN nausea and/or vomiting

## 2021-09-05 NOTE — CONSULT NOTE ADULT - SUBJECTIVE AND OBJECTIVE BOX
Gastroenterology Consultation:    Patient is a 71y old  Female who presents with a chief complaint of DKA (05 Sep 2021 09:04)        Admitted on: 09-04-21      HPI:  72 y/o f w/ pmhx of hypothyroidism not complaint with her synthroid, has not follow up with her physician in year, GERD presented with epigastric pain and acid taste in mouth for last 3 days. The abdominal pain was sharp, radiated to lower back on L side. Pain was associated with nausea and vomiting, nbnb, diaphoresis. She denied any  fever, chills, cp,  pleuritic cp, sob, palpitations, cough, ha/lh/dizziness, numbness/tingling, neck pain/ stiffness, abd pain, constipation, melena/brbpr,  sick contacts, recent travel or rash. Patient is non compliant with any health or screening measures and has not been to her  PMD in a decade.     ICU Vital Signs Last 24 Hrs  T(C): 36.2 (03 Sep 2021 17:20), Max: 36.2 (03 Sep 2021 17:20)  T(F): 97.1 (03 Sep 2021 17:20), Max: 97.1 (03 Sep 2021 17:20)  HR: 68 (03 Sep 2021 17:20) (68 - 68)  BP: 126/79 (03 Sep 2021 17:20) (126/79 - 126/79)  RR: 20 (03 Sep 2021 17:20) (20 - 20)  SpO2: 96% (03 Sep 2021 17:20) (96% - 96%)    She was received vitally stable in ER. Blood work was consistent with hyperglycemia and elevated ketone bodies. A presumptive DKA diagnosis secondary to unknown DM was made in ER and patient received an insulin IV bolus. She was started on insulin gtt and referred to ICU for admission.    (04 Sep 2021 01:53)        Prior EGD: none    Prior Colonoscopy: none      PAST MEDICAL & SURGICAL HISTORY:        FAMILY HISTORY:      Social History:  Tobacco: denies  Alcohol: occassional   Drugs: denies    Home Medications:        MEDICATIONS  (STANDING):  aspirin  chewable 81 milliGRAM(s) Oral daily  atorvastatin 40 milliGRAM(s) Oral at bedtime  chlorhexidine 4% Liquid 1 Application(s) Topical <User Schedule>  dextrose 40% Gel 15 Gram(s) Oral once  dextrose 5%. 1000 milliLiter(s) (50 mL/Hr) IV Continuous <Continuous>  dextrose 5%. 1000 milliLiter(s) (100 mL/Hr) IV Continuous <Continuous>  dextrose 50% Injectable 25 Gram(s) IV Push once  dextrose 50% Injectable 12.5 Gram(s) IV Push once  dextrose 50% Injectable 25 Gram(s) IV Push once  glucagon  Injectable 1 milliGRAM(s) IntraMuscular once  heparin   Injectable 5000 Unit(s) SubCutaneous every 12 hours  insulin glargine Injectable (LANTUS) 9 Unit(s) SubCutaneous every morning  insulin lispro (ADMELOG) corrective regimen sliding scale   SubCutaneous three times a day before meals  insulin lispro Injectable (ADMELOG) 3 Unit(s) SubCutaneous three times a day before meals  insulin regular Infusion 3 Unit(s)/Hr (3 mL/Hr) IV Continuous <Continuous>  lisinopril 10 milliGRAM(s) Oral daily  pantoprazole    Tablet 40 milliGRAM(s) Oral before breakfast    MEDICATIONS  (PRN):  metoclopramide 10 milliGRAM(s) Oral every 6 hours PRN nausea and/or vomiting  traMADol 25 milliGRAM(s) Oral four times a day PRN Mild Pain (1 - 3)      Allergies  No Known Allergies      Review of Systems:   Constitutional:  No Fever, No Chills  ENT/Mouth:  No Hearing Changes,  No Difficulty Swallowing  Eyes:  No Eye Pain, No Vision Changes  Cardiovascular:  No Chest Pain, No Palpitations  Respiratory:  No Cough, No Dyspnea  Gastrointestinal:  As described in HPI  Musculoskeletal:  No Joint Swelling, No Back Pain  Skin:  No Skin Lesions, No Jaundice  Neuro:  No Syncope, No Dizziness  Heme/Lymph:  No Bruising, No Bleeding.          Physical Examination:  T(C): 36.1 (09-05-21 @ 12:35), Max: 36.6 (09-05-21 @ 05:12)  HR: 50 (09-05-21 @ 12:35) (50 - 68)  BP: 164/72 (09-05-21 @ 12:35) (164/72 - 210/81)  RR: 18 (09-05-21 @ 12:35) (18 - 19)  SpO2: 93% (09-05-21 @ 01:58) (93% - 93%)          GENERAL:  Appears stated age, well-groomed, well-nourished, no distress  HEENT:  NC/AT,  conjunctivae clear and pink, no thyromegaly, nodules, adenopathy, sclera -anicteric  CHEST:  Full & symmetric excursion, no increased effort, breath sounds clear  HEART:  Regular rhythm, S1, S2, no murmur/rub/S3/S4, no abdominal bruit, no edema  ABDOMEN:  Soft,+ve tenderness to deep palpation in epigastrium non-distended, normoactive bowel sounds,  no masses ,no hepato-splenomegaly, no signs of chronic liver disease  EXTEREMITIES:  no cyanosis,clubbing or edema  SKIN:  No rash/erythema/ecchymoses/petechiae/wounds/abscess/warm/dry  NEURO:  Alert, oriented, no asterixis, no tremor, no encephalopathy          Data:                        14.9   8.95  )-----------( 237      ( 05 Sep 2021 11:00 )             44.4     Hgb Trend:  14.9  09-05-21 @ 11:00  14.3  09-05-21 @ 08:13  14.5  09-04-21 @ 03:49  15.6  09-03-21 @ 18:17      09-05    136  |  97<L>  |  10  ----------------------------<  164<H>  4.9   |  26  |  0.6<L>    Ca    9.2      05 Sep 2021 11:00  Mg     2.0     09-05    TPro  6.9  /  Alb  4.2  /  TBili  7.3<H>  /  DBili  6.2<H>  /  AST  348<H>  /  ALT  608<H>  /  AlkPhos  441<H>  09-05    Liver panel trend:  TBili 7.3   /      /      /   AlkP 441   /   Tptn 6.9   /   Alb 4.2    /   DBili 6.2      09-05  TBili 7.4   /      /      /   AlkP 446   /   Tptn 6.8   /   Alb 4.1    /   DBili --      09-05  TBili 3.9   /      /      /   AlkP 480   /   Tptn 6.6   /   Alb 4.0    /   DBili --      09-04  TBili 3.1   /      /      /   AlkP 469   /   Tptn 7.2   /   Alb 4.1    /   DBili --      09-03  TBili 2.8   /      /      /   AlkP 574   /   Tptn 7.6   /   Alb 4.4    /   DBili 1.7      09-03          Culture - Urine (collected 03 Sep 2021 20:28)  Source: Clean Catch Clean Catch (Midstream)  Final Report (05 Sep 2021 05:57):    <10,000 CFU/mL Normal Urogenital Marley          Radiology:

## 2021-09-05 NOTE — CONSULT NOTE ADULT - ATTENDING COMMENTS
70 yo F not known diabetic prior to presentation admitted with DKA. reported epigastric pain that radiates to the back decreased appetite (no weigh loss). SP CCY, CT showed dilated intrahepatics and CBD. Bili up to 7 with transaminases in the hundreds. As pain may be due to DKA, (however not improving despite resolution of DKA) it is challenging to determine whether it is biliary in nature. We recommend MRCP to evaluate for choledocholithiasis (despite the high likelihood for choledocholithiasis with a bilirubin exceeding 4). At this point we cannot rule out hepatitis (DILI vs other etiologies) though the presentation with pain favors an obstructive etiology. If MRCP negative and the suspicion for an obstructive etiology is no longer considered, we will recommend the above mentioned liver work up.

## 2021-09-05 NOTE — CONSULT NOTE ADULT - ASSESSMENT
70 y/o f w/ pmhx of hypothyroidism not complaint with her synthroid, has not follow up with her physician in year, GERD presented with epigastric pain and acid taste in mouth for last 3 days. PT was admitted for new DKA s/p insulin drip and LFTs were noted to be elevated on admission. GI was consulted for eval    #Moderate Acute vs Chronic? Liver Injury - unclear etiology - must r/o viral hepatitis vs AIH vs Toxins vs DILI  - LFTs: 2.8/574/350/449 -> 7.4/446/336/583  - CTAP: s/p CCy w/ intrahepatic dilation. CBD 9mm  - Medications reviewed: new hepatotoxic meds: lipitor and Tylenol  - Denies etoh or illicit drug use or herbal mediations. no family hx of GI cancers, autoimmune disorders. pt is not on any prescription medications at home  - R Ratio - 2.3    Rec  - Please obtain RUQ sono  - please obtain daily LFTS and coagulation profile  - Please send Hepatitis A IgM, Hepatitis B core IgM, core Ab total, surface Ag, surface Ab, HCV antibody, HCV RNA, Anti HEV  - Please send Iron studies and ceruloplasmin  - Please obtain Quantiferon level  - Please send GULSHAN, AMA, anti-Smooth Muscle Ab type 1, Anti liver-kidney microsomal Ab, Anti-soluble liver Ag, immunoglobulin panel  - Please send CMV PCR, EBV PCR, HSV IgM  - Please send Serum Drug screen and Utox  - Please check tylenol level  - Please obtain RUQ sono with Doppler  - Please avoid hepatotoxic medications - lipitor and tylenol       70 y/o f w/ pmhx of hypothyroidism not complaint with her synthroid, has not follow up with her physician in year, GERD presented with epigastric pain and acid taste in mouth for last 3 days. PT was admitted for new DKA s/p insulin drip and LFTs were noted to be elevated on admission. GI was consulted for eval    #Moderate Acute vs Chronic? Liver Injury - unclear etiology - must r/o viral hepatitis vs AIH vs Toxins vs DILI  #High likelihood for choledocholithiasis  - LFTs: 2.8/574/350/449 -> 7.4/446/336/583  - CTAP: s/p CCy w/ intrahepatic dilation. CBD 9mm  - Medications reviewed: new hepatotoxic meds: lipitor and Tylenol  - Denies etoh or illicit drug use or herbal mediations. no family hx of GI cancers, autoimmune disorders. pt is not on any prescription medications at home  - R Ratio - 2.3    Rec  - MRCP  - Please obtain RUQ sono  - please obtain daily LFTS and coagulation profile  - Please send Hepatitis A IgM, Hepatitis B core IgM, core Ab total, surface Ag, surface Ab, HCV antibody, HCV RNA, Anti HEV  - Please send Iron studies and ceruloplasmin  - Please obtain Quantiferon level  - Please send GULSHAN, AMA, anti-Smooth Muscle Ab type 1, Anti liver-kidney microsomal Ab, Anti-soluble liver Ag, immunoglobulin panel  - Please send CMV PCR, EBV PCR, HSV IgM  - Please send Serum Drug screen and Utox  - Please check tylenol level  - Please obtain RUQ sono with Doppler  - Please avoid hepatotoxic medications

## 2021-09-05 NOTE — PROGRESS NOTE ADULT - ASSESSMENT
72 y/o f w/ pmhx of hypothyroidism not complaint with her synthroid, has not follow up with her physician in year, GERD presented with epigastric pain and acid taste in mouth for  associated with nausea and vomiting, nbnb, diaphoresis, in the setting of hyperglycemia and elevated ketone bodies all suggestive of a presentation of DKA diagnosis secondary to unknown DM was made in ER and patient received an insulin IV bolus. She was started on insulin gtt and referred to ICU for admission.       # Abdominal pain   # NBNB emesis  # Elevated glucose  # HAGMA  # Ketonuria  - BG on admission ~500, AG 21, LA 2.2, B-hydroxybutrate 1.0  - no formal dx of DM in past but has not been to PCP reportedly >10 years  - A1c is 8.7  - admitted to the unit to be started on insulin gtt and IVF  - GAP closed to 13, BG <200; no downgraded to floor  - c/w lantus 9u qAM, humalog 3u AC, SSI  - Carb Consistent diet  - will need outpatient PMD, ophth, podiatry and endocrine referral set up  - Diabetes educator c/s placed  - starting on lisinopril 10mg daily  - starting on atorvastatin 40mg daily  - starting on ASA 81mg daily    # Accelerated HTN  - starting lisinopril 10mg as above  - starting low and slow as patient has never been on anti hypertensive therapy. will titrate up lisinopril as needed    # Transaminitis  - unsure etiology  - AST: 335->417  - ALT: 414->536  - Tbili: 2.8-->3.1-->3.9  - ordering acute hep panel, GULSHAN, anti-mitochondria, anti-smooth, CMV, EBV, LDH, Iron profile + ferritin, ceruloplasmin, alpha-1 antitrypsin  - US pending  - will place c/s with GI    #Leukocytosis  - 8k-->12k  - likely reactive  - no focal signs/sx of infection  - afebrile  - continue to monitor  - hold abx for now    #GERD  C/w PPI    #Hypothyroidism:  - check TSH to assess need of medication, pt has been non compliant for years.    DVT PPX  GI PPX  CHG  FULL CODE    #Progress Note Handoff  Pending (specify):  DM educator, Hepatitis work up, GI f/up  Family discussion: patient updated. in agreement of plan. all questions answered  Disposition: Unknown at this time________   72 y/o f w/ pmhx of hypothyroidism not complaint with her synthroid, has not follow up with her physician in year, GERD presented with epigastric pain and acid taste in mouth for  associated with nausea and vomiting, nbnb, diaphoresis, in the setting of hyperglycemia and elevated ketone bodies all suggestive of a presentation of DKA diagnosis secondary to unknown DM was made in ER and patient received an insulin IV bolus. She was started on insulin gtt and referred to ICU for admission.       # Abdominal pain   # NBNB emesis  # Elevated glucose  # HAGMA  # Ketonuria  - BG on admission ~500, AG 21, LA 2.2, B-hydroxybutrate 1.0  - no formal dx of DM in past but has not been to PCP reportedly >10 years  - A1c is 8.7  - admitted to the unit to be started on insulin gtt and IVF  - GAP closed to 13, BG <200; no downgraded to floor  - c/w lantus 9u qAM, humalog 3u AC, SSI  - Carb Consistent diet  - will need outpatient PMD, ophth, podiatry and endocrine referral set up  - Diabetes educator c/s placed  - starting on lisinopril 10mg daily  - starting on atorvastatin 40mg daily  - starting on ASA 81mg daily    # Accelerated HTN  - starting lisinopril 10mg as above  - starting low and slow as patient has never been on anti hypertensive therapy. will titrate up lisinopril as needed    # Transaminitis  - unsure etiology  - AST: 335->417  - ALT: 414->536  - Tbili: 2.8-->3.1-->3.9  - ordering acute hep panel, GULSHAN, anti-mitochondria, anti-smooth, CMV, EBV, LDH, Iron profile + ferritin, ceruloplasmin, alpha-1 antitrypsin  - US pending  - will place c/s with GI    #Leukocytosis  - 8k-->12k  - likely reactive  - no focal signs/sx of infection  - afebrile  - continue to monitor  - hold abx for now    #GERD  C/w PPI    #Hypothyroidism:  - check TSH to assess need of medication, pt has been non compliant for years.    DVT PPX  GI PPX  CHG  FULL CODE    #Progress Note Handoff  Pending (specify):  DM educator, Hepatitis work up, GI f/up  Family discussion: patient updated. in agreement of plan. all questions answered. Updated daughter Anna(PICU nurse in [place]), [phone]  Disposition: Unknown at this time________

## 2021-09-06 LAB
A1AT SERPL-MCNC: 220 MG/DL — HIGH (ref 90–200)
A1C WITH ESTIMATED AVERAGE GLUCOSE RESULT: 8.6 % — HIGH (ref 4–5.6)
ALBUMIN SERPL ELPH-MCNC: 3.9 G/DL — SIGNIFICANT CHANGE UP (ref 3.5–5.2)
ALP SERPL-CCNC: 444 U/L — HIGH (ref 30–115)
ALT FLD-CCNC: 587 U/L — HIGH (ref 0–41)
ANION GAP SERPL CALC-SCNC: 11 MMOL/L — SIGNIFICANT CHANGE UP (ref 7–14)
APAP SERPL-MCNC: <5 UG/ML — LOW (ref 10–30)
AST SERPL-CCNC: 343 U/L — HIGH (ref 0–41)
BASOPHILS # BLD AUTO: 0.01 K/UL — SIGNIFICANT CHANGE UP (ref 0–0.2)
BASOPHILS NFR BLD AUTO: 0.1 % — SIGNIFICANT CHANGE UP (ref 0–1)
BILIRUB SERPL-MCNC: 8.2 MG/DL — HIGH (ref 0.2–1.2)
BUN SERPL-MCNC: 13 MG/DL — SIGNIFICANT CHANGE UP (ref 10–20)
CALCIUM SERPL-MCNC: 9 MG/DL — SIGNIFICANT CHANGE UP (ref 8.5–10.1)
CERULOPLASMIN SERPL-MCNC: 29 MG/DL — SIGNIFICANT CHANGE UP (ref 16–45)
CHLORIDE SERPL-SCNC: 98 MMOL/L — SIGNIFICANT CHANGE UP (ref 98–110)
CO2 SERPL-SCNC: 28 MMOL/L — SIGNIFICANT CHANGE UP (ref 17–32)
COVID-19 SPIKE DOMAIN AB INTERP: POSITIVE
COVID-19 SPIKE DOMAIN ANTIBODY RESULT: 97.4 U/ML — HIGH
CREAT SERPL-MCNC: 0.6 MG/DL — LOW (ref 0.7–1.5)
EBV EA AB SER IA-ACNC: <5 U/ML — SIGNIFICANT CHANGE UP
EBV EA AB TITR SER IF: POSITIVE
EBV EA IGG SER-ACNC: NEGATIVE — SIGNIFICANT CHANGE UP
EBV NA IGG SER IA-ACNC: 353 U/ML — HIGH
EBV PATRN SPEC IB-IMP: SIGNIFICANT CHANGE UP
EBV VCA IGG AVIDITY SER QL IA: POSITIVE
EBV VCA IGM SER IA-ACNC: <10 U/ML — SIGNIFICANT CHANGE UP
EBV VCA IGM SER IA-ACNC: >750 U/ML — HIGH
EBV VCA IGM TITR FLD: NEGATIVE — SIGNIFICANT CHANGE UP
EOSINOPHIL # BLD AUTO: 0 K/UL — SIGNIFICANT CHANGE UP (ref 0–0.7)
EOSINOPHIL NFR BLD AUTO: 0 % — SIGNIFICANT CHANGE UP (ref 0–8)
ESTIMATED AVERAGE GLUCOSE: 200 MG/DL — HIGH (ref 68–114)
FERRITIN SERPL-MCNC: 1370 NG/ML — HIGH (ref 15–150)
GLUCOSE BLDC GLUCOMTR-MCNC: 107 MG/DL — HIGH (ref 70–99)
GLUCOSE BLDC GLUCOMTR-MCNC: 112 MG/DL — HIGH (ref 70–99)
GLUCOSE BLDC GLUCOMTR-MCNC: 122 MG/DL — HIGH (ref 70–99)
GLUCOSE BLDC GLUCOMTR-MCNC: 151 MG/DL — HIGH (ref 70–99)
GLUCOSE SERPL-MCNC: 166 MG/DL — HIGH (ref 70–99)
HAV IGM SER-ACNC: SIGNIFICANT CHANGE UP
HBV CORE IGM SER-ACNC: SIGNIFICANT CHANGE UP
HBV SURFACE AG SER-ACNC: SIGNIFICANT CHANGE UP
HCT VFR BLD CALC: 45.4 % — SIGNIFICANT CHANGE UP (ref 37–47)
HCV AB S/CO SERPL IA: 0.14 S/CO — SIGNIFICANT CHANGE UP (ref 0–0.99)
HCV AB SERPL-IMP: SIGNIFICANT CHANGE UP
HGB BLD-MCNC: 15 G/DL — SIGNIFICANT CHANGE UP (ref 12–16)
IMM GRANULOCYTES NFR BLD AUTO: 0.4 % — HIGH (ref 0.1–0.3)
LYMPHOCYTES # BLD AUTO: 1.04 K/UL — LOW (ref 1.2–3.4)
LYMPHOCYTES # BLD AUTO: 11.5 % — LOW (ref 20.5–51.1)
MAGNESIUM SERPL-MCNC: 2.1 MG/DL — SIGNIFICANT CHANGE UP (ref 1.8–2.4)
MCHC RBC-ENTMCNC: 28.8 PG — SIGNIFICANT CHANGE UP (ref 27–31)
MCHC RBC-ENTMCNC: 33 G/DL — SIGNIFICANT CHANGE UP (ref 32–37)
MCV RBC AUTO: 87.1 FL — SIGNIFICANT CHANGE UP (ref 81–99)
MONOCYTES # BLD AUTO: 0.68 K/UL — HIGH (ref 0.1–0.6)
MONOCYTES NFR BLD AUTO: 7.5 % — SIGNIFICANT CHANGE UP (ref 1.7–9.3)
NEUTROPHILS # BLD AUTO: 7.27 K/UL — HIGH (ref 1.4–6.5)
NEUTROPHILS NFR BLD AUTO: 80.5 % — HIGH (ref 42.2–75.2)
NRBC # BLD: 0 /100 WBCS — SIGNIFICANT CHANGE UP (ref 0–0)
PLATELET # BLD AUTO: 254 K/UL — SIGNIFICANT CHANGE UP (ref 130–400)
POTASSIUM SERPL-MCNC: 4.5 MMOL/L — SIGNIFICANT CHANGE UP (ref 3.5–5)
POTASSIUM SERPL-SCNC: 4.5 MMOL/L — SIGNIFICANT CHANGE UP (ref 3.5–5)
PROT SERPL-MCNC: 6.5 G/DL — SIGNIFICANT CHANGE UP (ref 6–8)
RBC # BLD: 5.21 M/UL — SIGNIFICANT CHANGE UP (ref 4.2–5.4)
RBC # FLD: 14.2 % — SIGNIFICANT CHANGE UP (ref 11.5–14.5)
SARS-COV-2 IGG+IGM SERPL QL IA: 97.4 U/ML — HIGH
SARS-COV-2 IGG+IGM SERPL QL IA: POSITIVE
SODIUM SERPL-SCNC: 137 MMOL/L — SIGNIFICANT CHANGE UP (ref 135–146)
WBC # BLD: 9.04 K/UL — SIGNIFICANT CHANGE UP (ref 4.8–10.8)
WBC # FLD AUTO: 9.04 K/UL — SIGNIFICANT CHANGE UP (ref 4.8–10.8)

## 2021-09-06 PROCEDURE — 74183 MRI ABD W/O CNTR FLWD CNTR: CPT | Mod: 26

## 2021-09-06 PROCEDURE — 99233 SBSQ HOSP IP/OBS HIGH 50: CPT

## 2021-09-06 RX ADMIN — Medication 10 MILLIGRAM(S): at 15:13

## 2021-09-06 RX ADMIN — TRAMADOL HYDROCHLORIDE 25 MILLIGRAM(S): 50 TABLET ORAL at 04:10

## 2021-09-06 RX ADMIN — PANTOPRAZOLE SODIUM 40 MILLIGRAM(S): 20 TABLET, DELAYED RELEASE ORAL at 08:07

## 2021-09-06 RX ADMIN — TRAMADOL HYDROCHLORIDE 25 MILLIGRAM(S): 50 TABLET ORAL at 03:08

## 2021-09-06 RX ADMIN — CHLORHEXIDINE GLUCONATE 1 APPLICATION(S): 213 SOLUTION TOPICAL at 05:29

## 2021-09-06 RX ADMIN — Medication 3 UNIT(S): at 08:06

## 2021-09-06 RX ADMIN — INSULIN GLARGINE 9 UNIT(S): 100 INJECTION, SOLUTION SUBCUTANEOUS at 08:07

## 2021-09-06 RX ADMIN — Medication 1: at 08:06

## 2021-09-06 RX ADMIN — TRAMADOL HYDROCHLORIDE 25 MILLIGRAM(S): 50 TABLET ORAL at 10:07

## 2021-09-06 RX ADMIN — Medication 10 MILLIGRAM(S): at 03:08

## 2021-09-06 RX ADMIN — LISINOPRIL 10 MILLIGRAM(S): 2.5 TABLET ORAL at 05:28

## 2021-09-06 RX ADMIN — Medication 10 MILLIGRAM(S): at 08:56

## 2021-09-06 RX ADMIN — TRAMADOL HYDROCHLORIDE 25 MILLIGRAM(S): 50 TABLET ORAL at 18:48

## 2021-09-06 RX ADMIN — Medication 81 MILLIGRAM(S): at 11:12

## 2021-09-06 RX ADMIN — HEPARIN SODIUM 5000 UNIT(S): 5000 INJECTION INTRAVENOUS; SUBCUTANEOUS at 05:28

## 2021-09-06 RX ADMIN — TRAMADOL HYDROCHLORIDE 25 MILLIGRAM(S): 50 TABLET ORAL at 17:26

## 2021-09-06 RX ADMIN — Medication 3 UNIT(S): at 11:52

## 2021-09-06 RX ADMIN — TRAMADOL HYDROCHLORIDE 25 MILLIGRAM(S): 50 TABLET ORAL at 09:01

## 2021-09-06 RX ADMIN — HEPARIN SODIUM 5000 UNIT(S): 5000 INJECTION INTRAVENOUS; SUBCUTANEOUS at 17:26

## 2021-09-06 NOTE — PROGRESS NOTE ADULT - SUBJECTIVE AND OBJECTIVE BOX
LEANDERRUBIN  71y, Female  Allergy: No Known Allergies    Hospital Day: 2d    Patient seen and examined earlier today. Continues to endorse some back and epigastric pain. Plan for MRCP later today.     PMH/PSH:  PAST MEDICAL & SURGICAL HISTORY:      LAST 24-Hr EVENTS:    VITALS:  T(F): 96.5 (09-06-21 @ 13:37), Max: 97.7 (09-05-21 @ 20:19)  HR: 53 (09-06-21 @ 13:37)  BP: 132/60 (09-06-21 @ 13:37) (130/63 - 145/64)  RR: 18 (09-06-21 @ 13:37)  SpO2: --        TESTS & MEASUREMENTS:  Weight (Kg):   BMI (kg/m2): 35.1 (09-03)                          15.0   9.04  )-----------( 254      ( 06 Sep 2021 05:25 )             45.4       09-06    137  |  98  |  13  ----------------------------<  166<H>  4.5   |  28  |  0.6<L>    Ca    9.0      06 Sep 2021 05:25  Mg     2.1     09-06    TPro  6.5  /  Alb  3.9  /  TBili  8.2<H>  /  DBili  x   /  AST  343<H>  /  ALT  587<H>  /  AlkPhos  444<H>  09-06    LIVER FUNCTIONS - ( 06 Sep 2021 05:25 )  Alb: 3.9 g/dL / Pro: 6.5 g/dL / ALK PHOS: 444 U/L / ALT: 587 U/L / AST: 343 U/L / GGT: x                 Culture - Urine (collected 09-03-21 @ 20:28)  Source: Clean Catch Clean Catch (Midstream)  Final Report (09-05-21 @ 05:57):    <10,000 CFU/mL Normal Urogenital Marley                COVID-19 PCR: NotDetec (09-03-21 @ 18:02)      RADIOLOGY, ECG, & ADDITIONAL TESTS:      RECENT DIAGNOSTIC ORDERS:  MR Abdomen w/wo IV Cont: 10:26 (09-06-21 @ 15:37)  US Abdomen Doppler: Routine   Indication: RUQ Doppler US  Transport: Stretcher-Crib  Addl Info: RUQ Doppler US (09-06-21 @ 14:22)  Diet, NPO:   NPO for Procedure/Test     NPO Start Date: 06-Sep-2021,   NPO Start Time: 14:20  Except Medications (09-06-21 @ 14:21)  Complete Blood Count + Automated Diff: AM Sched. Collection: 07-Sep-2021 04:30 (09-06-21 @ 11:53)  Comprehensive Metabolic Panel: AM Sched. Collection: 07-Sep-2021 04:30 (09-06-21 @ 11:53)  Magnesium, Serum: AM Sched. Collection: 07-Sep-2021 04:30 (09-06-21 @ 11:53)  Magnesium, Serum: Repeat From: 06-Sep-2021 11:52 To: 13-Sep-2021 04:30, Every 1 day(s) (09-06-21 @ 11:53)  Comprehensive Metabolic Panel: Repeat From: 06-Sep-2021 11:53 To: 13-Sep-2021 04:30, Every 1 day(s) (09-06-21 @ 11:53)  Complete Blood Count + Automated Diff: Repeat From: 06-Sep-2021 11:53 To: 13-Sep-2021 04:30, Every 1 day(s) (09-06-21 @ 11:53)  Acetaminophen Level, Serum: 16:00 (09-06-21 @ 11:16)  Drug Screen, Serum: 16:00 (09-06-21 @ 11:16)  Drug Screen 1, Urine: Routine (09-06-21 @ 11:16)  Herpes simplex (1,2) IgM, Serum: 16:00 (09-06-21 @ 11:16)  Immunoglobulins Panel: 16:00 (09-06-21 @ 11:16)  Soluble Liver Antigen Antibody: 16:00 (09-06-21 @ 11:16)  Microsomal Antibody Assay, Liver Kidney: 16:00 (09-06-21 @ 11:16)  Quantiferon Plus TB: 16:00 (09-06-21 @ 11:16)  Hepatitis E Ab IgM: 16:00 (09-06-21 @ 11:16)  Hepatitis C RNA, Qualitative: 16:00 (09-06-21 @ 11:16)  Hepatitis B Surface Antibody: 16:00 (09-06-21 @ 11:16)  Hepatitis B Surface Antigen: 16:00 (09-06-21 @ 11:16)  Hepatitis B Core Antibody, Total: 16:00 (09-06-21 @ 11:16)  Hepatitis B Core IgM Antibody: 16:00 (09-06-21 @ 11:16)  Hepatitis B Core IgM Antibody: 16:00 (09-06-21 @ 11:16)  Hepatitis A IgM Antibody: 16:00 (09-06-21 @ 11:16)  Diet, NPO after Midnight:      NPO Start Date: 06-Sep-2021,   NPO Start Time: 23:59 (09-06-21 @ 09:06)      MEDICATIONS:  MEDICATIONS  (STANDING):  aspirin  chewable 81 milliGRAM(s) Oral daily  chlorhexidine 4% Liquid 1 Application(s) Topical <User Schedule>  dextrose 40% Gel 15 Gram(s) Oral once  dextrose 5%. 1000 milliLiter(s) (50 mL/Hr) IV Continuous <Continuous>  dextrose 5%. 1000 milliLiter(s) (100 mL/Hr) IV Continuous <Continuous>  dextrose 50% Injectable 25 Gram(s) IV Push once  dextrose 50% Injectable 12.5 Gram(s) IV Push once  dextrose 50% Injectable 25 Gram(s) IV Push once  glucagon  Injectable 1 milliGRAM(s) IntraMuscular once  heparin   Injectable 5000 Unit(s) SubCutaneous every 12 hours  insulin glargine Injectable (LANTUS) 9 Unit(s) SubCutaneous every morning  insulin lispro (ADMELOG) corrective regimen sliding scale   SubCutaneous three times a day before meals  insulin lispro Injectable (ADMELOG) 3 Unit(s) SubCutaneous three times a day before meals  lisinopril 10 milliGRAM(s) Oral daily  pantoprazole    Tablet 40 milliGRAM(s) Oral before breakfast    MEDICATIONS  (PRN):  metoclopramide 10 milliGRAM(s) Oral every 6 hours PRN nausea and/or vomiting  traMADol 25 milliGRAM(s) Oral four times a day PRN Mild Pain (1 - 3)      HOME MEDICATIONS:      PHYSICAL EXAM:  GENERAL: elderly F, NAD, non toxic appearing  EYES: icteric sclera   ENT: hearing grossly intact, oropharynx clear, MMM  PSYCH: no agitation, baseline mentation  NERVOUS SYSTEM:  Alert & Oriented X3, CN 2-12 grossly intact  PULMONARY: Clear to auscultation bilaterally; No rales, rhonchi, wheezing, or rubs  CARDIOVASCULAR: Regular rate and rhythm; No murmurs, rubs, or gallops, no LE edema  GI: Soft, Nontender, Nondistended; Bowel sounds present  EXTREMITIES:  2+ Peripheral Pulses, No clubbing, cyanosis  LYMPH: No lymphadenopathy noted  SKIN: No rashes or lesions, jaundiced

## 2021-09-06 NOTE — CONSULT NOTE ADULT - ASSESSMENT
unclear cause of hepatitis, and jaundice, await tests oedered by gi team. has type 2 diabetes, and can be managed on non insulin medications in the long term, but in view of jaundice, no orals for now, please call dr seo at discharge, and will discuss outpatient medications when she is discharged home. [phone].

## 2021-09-06 NOTE — PROGRESS NOTE ADULT - SUBJECTIVE AND OBJECTIVE BOX
Gastroenterology progress note:     Patient is a 71y old  Female who presents with a chief complaint of DKA (06 Sep 2021 16:25)       Admitted on: 09-04-21    We are following the patient for: elevated liver enzymes       Interval History:    No acute events overnight.   - Diet - tolerating  - last BM - 2 days ago  - Abdominal pain - minimal epigastric      PAST MEDICAL & SURGICAL HISTORY:      MEDICATIONS  (STANDING):  aspirin  chewable 81 milliGRAM(s) Oral daily  chlorhexidine 4% Liquid 1 Application(s) Topical <User Schedule>  dextrose 40% Gel 15 Gram(s) Oral once  dextrose 5%. 1000 milliLiter(s) (50 mL/Hr) IV Continuous <Continuous>  dextrose 5%. 1000 milliLiter(s) (100 mL/Hr) IV Continuous <Continuous>  dextrose 50% Injectable 25 Gram(s) IV Push once  dextrose 50% Injectable 12.5 Gram(s) IV Push once  dextrose 50% Injectable 25 Gram(s) IV Push once  glucagon  Injectable 1 milliGRAM(s) IntraMuscular once  heparin   Injectable 5000 Unit(s) SubCutaneous every 12 hours  insulin glargine Injectable (LANTUS) 9 Unit(s) SubCutaneous every morning  insulin lispro (ADMELOG) corrective regimen sliding scale   SubCutaneous three times a day before meals  insulin lispro Injectable (ADMELOG) 3 Unit(s) SubCutaneous three times a day before meals  lisinopril 10 milliGRAM(s) Oral daily  pantoprazole    Tablet 40 milliGRAM(s) Oral before breakfast    MEDICATIONS  (PRN):  metoclopramide 10 milliGRAM(s) Oral every 6 hours PRN nausea and/or vomiting  traMADol 25 milliGRAM(s) Oral four times a day PRN Mild Pain (1 - 3)      Allergies  No Known Allergies      Review of Systems:   Cardiovascular:  No Chest Pain, No Palpitations  Respiratory:  No Cough, No Dyspnea  Gastrointestinal:  As described in HPI  Skin:  No Skin Lesions, No Jaundice  Neuro:  No Syncope, No Dizziness    Physical Examination:  T(C): 35.8 (09-06-21 @ 13:37), Max: 36.5 (09-05-21 @ 20:19)  HR: 53 (09-06-21 @ 13:37) (52 - 57)  BP: 132/60 (09-06-21 @ 13:37) (130/63 - 145/64)  RR: 18 (09-06-21 @ 13:37) (18 - 18)  SpO2: --        GENERAL:  Appears stated age, well-groomed, well-nourished, no distress  HEENT:  NC/AT,  conjunctivae clear and pink, no thyromegaly, nodules, adenopathy, scleral icterus  CHEST:  Full & symmetric excursion, no increased effort, breath sounds clear  HEART:  Regular rhythm, S1, S2, no murmur/rub/S3/S4, no abdominal bruit, no edema  ABDOMEN:  Soft,+ve tenderness to deep palpation in epigastrium non-distended, normoactive bowel sounds,  no masses ,no hepato-splenomegaly, no signs of chronic liver disease  EXTEREMITIES:  no cyanosis,clubbing or edema  SKIN:  No rash/erythema/ecchymoses/petechiae/wounds/abscess/warm/dry> +ve jaundice  NEURO:  Alert, oriented, no asterixis, no tremor, no encephalopathy        Data:                        15.0   9.04  )-----------( 254      ( 06 Sep 2021 05:25 )             45.4     Hgb trend:  15.0  09-06-21 @ 05:25  14.9  09-05-21 @ 11:00  14.3  09-05-21 @ 08:13  14.5  09-04-21 @ 03:49  15.6  09-03-21 @ 18:17      09-06    137  |  98  |  13  ----------------------------<  166<H>  4.5   |  28  |  0.6<L>    Ca    9.0      06 Sep 2021 05:25  Mg     2.1     09-06    TPro  6.5  /  Alb  3.9  /  TBili  8.2<H>  /  DBili  x   /  AST  343<H>  /  ALT  587<H>  /  AlkPhos  444<H>  09-06    Liver panel trend:  TBili 8.2   /      /      /   AlkP 444   /   Tptn 6.5   /   Alb 3.9    /   DBili --      09-06  TBili 7.3   /      /      /   AlkP 441   /   Tptn 6.9   /   Alb 4.2    /   DBili 6.2      09-05  TBili 7.4   /      /      /   AlkP 446   /   Tptn 6.8   /   Alb 4.1    /   DBili --      09-05  TBili 3.9   /      /      /   AlkP 480   /   Tptn 6.6   /   Alb 4.0    /   DBili --      09-04  TBili 3.1   /      /      /   AlkP 469   /   Tptn 7.2   /   Alb 4.1    /   DBili --      09-03  TBili 2.8   /      /      /   AlkP 574   /   Tptn 7.6   /   Alb 4.4    /   DBili 1.7      09-03          Culture - Urine (collected 03 Sep 2021 20:28)  Source: Clean Catch Clean Catch (Midstream)  Final Report (05 Sep 2021 05:57):    <10,000 CFU/mL Normal Urogenital Marley         Radiology:    US Abdomen Upper Quadrant Right:   EXAM:  US ABDOMEN RT UPR QUADRANT            PROCEDURE DATE:  09/05/2021            INTERPRETATION:  CLINICAL INFORMATION: Transaminitis    Correlation is made with CT abdomen and pelvis September 3, 2021    TECHNIQUE: Sonography of the right upperquadrant.    FINDINGS:    Liver: Intrahepatic biliary ductal dilatation. Diffuse increased echogenicity.  Bile ducts: Diffuse biliary ductal dilatation, common bile duct measuring 1.0 cm.  Gallbladder: Cholecystectomy.  Pancreas: Visualized portions are within normal limits.  Right kidney: 12.0 cm. No hydronephrosis.  Ascites: None.  IVC: Visualized portions are within normal limits.    IMPRESSION:    Post cholecystectomy. Diffuse biliary ductal dilatation, common bile duct measuring 1.0 cm. Findings are unchanged from referenced CT.    Hepatic steatosis.    --- End of Report ---              ELLIOT LANDAU MD; Attending Radiologist  This document has been electronically signed. Sep  6 2021  9:19AM (09-05-21 @ 16:46)

## 2021-09-06 NOTE — PROGRESS NOTE ADULT - SUBJECTIVE AND OBJECTIVE BOX
RUBIN TABOR  71y  Female      Patient is a 71y old  Female who presents with a chief complaint of abdominal pain and jaundice (06 Sep 2021 09:24)      INTERVAL HPI/OVERNIGHT EVENTS: No acute overnight events. Pt complains of back pain 8/10 non radiating, no alleviating or worsening factors. She also complains of nausea and epigastric pain. She denies any chest pain, shortness of breath, palpitations, dysuria, fecal incontinence, or saddle anesthesia. She denies any vomiting, hematemesis, melena, or hematochezia.      REVIEW OF SYSTEMS:  CONSTITUTIONAL: No fever, weight loss, +fatigue  EYES: kernicterus  ENMT:  No difficulty hearing, tinnitus, vertigo; No sinus or throat pain  NECK: No pain or stiffness  BREASTS: No pain, masses, or nipple discharge  RESPIRATORY: No cough, wheezing, chills or hemoptysis; No shortness of breath  CARDIOVASCULAR: No chest pain, palpitations, dizziness, or leg swelling  GASTROINTESTINAL: +abdominal or epigastric pain. +nausea, vomiting, or hematemesis; No diarrhea or constipation. No melena or hematochezia.  GENITOURINARY: No dysuria, frequency, hematuria, or incontinence  NEUROLOGICAL: No headaches, memory loss, loss of strength, numbness, or tremors  SKIN: No itching, burning, rashes, or lesions   LYMPH NODES: No enlarged glands  ENDOCRINE: No heat or cold intolerance; No hair loss  MUSCULOSKELETAL: +back pain, No muscle, back, or extremity pain  PSYCHIATRIC: No depression, anxiety, mood swings, or difficulty sleeping  HEME/LYMPH: No easy bruising, or bleeding gums  ALLERY AND IMMUNOLOGIC: No hives or eczema  FAMILY HISTORY:    T(C): 36.2 (09-06-21 @ 05:38), Max: 36.5 (09-05-21 @ 20:19)  HR: 57 (09-06-21 @ 05:38) (50 - 57)  BP: 130/63 (09-06-21 @ 05:38) (130/63 - 164/72)  RR: 18 (09-06-21 @ 05:38) (18 - 18)  SpO2: --  Wt(kg): --Vital Signs Last 24 Hrs  T(C): 36.2 (06 Sep 2021 05:38), Max: 36.5 (05 Sep 2021 20:19)  T(F): 97.2 (06 Sep 2021 05:38), Max: 97.7 (05 Sep 2021 20:19)  HR: 57 (06 Sep 2021 05:38) (50 - 57)  BP: 130/63 (06 Sep 2021 05:38) (130/63 - 164/72)  BP(mean): --  RR: 18 (06 Sep 2021 05:38) (18 - 18)  SpO2: --  No Known Allergies      PHYSICAL EXAM:  GENERAL: NAD, well-groomed, well-developed  HEAD:  Atraumatic, Normocephalic  EYES: EOMI, PERRLA, conjunctiva and sclera clear  ENMT: No tonsillar erythema, exudates, or enlargement; Moist mucous membranes, Good dentition, No lesions  NECK: Supple, No JVD, Normal thyroid  NERVOUS SYSTEM:  Alert & Oriented X3, Good concentration; Motor Strength 5/5 B/L upper and lower extremities; DTRs 2+ intact and symmetric  CHEST/LUNG: Clear to percussion bilaterally; No rales, rhonchi, wheezing, or rubs  HEART: Regular rate and rhythm; No murmurs, rubs, or gallops  ABDOMEN: Soft, Nondistended, tender in the epigastric region.  Bowel sounds present,  EXTREMITIES:  2+ Peripheral Pulses, No clubbing, cyanosis, or edema, spinal tenderness in the thoracolumbar area   LYMPH: No lymphadenopathy noted  SKIN: No rashes or lesions    Consultant(s) Notes Reviewed:  [x ] YES  [ ] NO  Care Discussed with Consultants/Other Providers [ x] YES  [ ] NO    LABS:                          15.0   9.04  )-----------( 254      ( 06 Sep 2021 05:25 )             45.4     09-06    137  |  98  |  13  ----------------------------<  166<H>  4.5   |  28  |  0.6<L>    Ca    9.0      06 Sep 2021 05:25  Mg     2.1     09-06    TPro  6.5  /  Alb  3.9  /  TBili  8.2<H>  /  DBili  x   /  AST  343<H>  /  ALT  587<H>  /  AlkPhos  444<H>  09-06  Anion Gap, Serum: 11 mmol/L (09.06.21 @ 05:25)   Anion Gap, Serum: 13 mmol/L (09.05.21 @ 11:00)   Anion Gap, Serum: 14 mmol/L (09.05.21 @ 08:13)   Anion Gap, Serum: 13 mmol/L (09.04.21 @ 11:41)   Anion Gap, Serum: 14 mmol/L (09.04.21 @ 05:51)   Anion Gap, Serum: 11 mmol/L (09.04.21 @ 03:49)   Anion Gap, Serum: 21 mmol/L (09.03.21 @ 22:51)   Anion Gap, Serum: 15 mmol/L (09.03.21 @ 18:17) Lipase, Serum (09.03.21 @ 18:17)   Lipase, Serum: 28 U/L   POCT Blood Glucose.: 151 mg/dL (06 Sep 2021 07:26)  POCT Blood Glucose.: 129 mg/dL (05 Sep 2021 21:37)  POCT Blood Glucose.: 131 mg/dL (05 Sep 2021 16:37)  A1C with Estimated Average Glucose Result: 8.7: Method: Immunoassay   RADIOLOGY & ADDITIONAL TESTS:    < from: US Abdomen Upper Quadrant Right (09.05.21 @ 16:46) >  Post cholecystectomy. Diffuse biliary ductal dilatation, common bile duct measuring 1.0 cm. Findings are unchanged from referenced CT.    Hepatic steatosis.    < from: CT Angio Abdomen and Pelvis VIOLETTA w/ IV Cont (09.03.21 @ 22:52) >  No CT evidence of aortic dissection or intramural hematoma.    No CT evidence of acute intrathoracic or intra-abdominal pathology.    Mild atherosclerotic changes of the distal aorta.    2 mm right lung middle lobe nodule. In low riskpatients, no routine follow-up required. In high-risk patients optional CT at 12 months is recommended.    < end of copied text >    < end of copied text >      Imaging Personally Reviewed:  [ ] YES  [ ] NO  aspirin  chewable 81 milliGRAM(s) Oral daily  chlorhexidine 4% Liquid 1 Application(s) Topical <User Schedule>  dextrose 40% Gel 15 Gram(s) Oral once  dextrose 5%. 1000 milliLiter(s) IV Continuous <Continuous>  dextrose 5%. 1000 milliLiter(s) IV Continuous <Continuous>  dextrose 50% Injectable 25 Gram(s) IV Push once  dextrose 50% Injectable 12.5 Gram(s) IV Push once  dextrose 50% Injectable 25 Gram(s) IV Push once  glucagon  Injectable 1 milliGRAM(s) IntraMuscular once  heparin   Injectable 5000 Unit(s) SubCutaneous every 12 hours  insulin glargine Injectable (LANTUS) 9 Unit(s) SubCutaneous every morning  insulin lispro (ADMELOG) corrective regimen sliding scale   SubCutaneous three times a day before meals  insulin lispro Injectable (ADMELOG) 3 Unit(s) SubCutaneous three times a day before meals  lisinopril 10 milliGRAM(s) Oral daily  metoclopramide 10 milliGRAM(s) Oral every 6 hours PRN  pantoprazole    Tablet 40 milliGRAM(s) Oral before breakfast  traMADol 25 milliGRAM(s) Oral four times a day PRN      HEALTH ISSUES - PROBLEM Dx:           RUBIN TABOR  71y  Female      Patient is a 71y old  Female who presents with a chief complaint of abdominal pain and jaundice (06 Sep 2021 09:24)      INTERVAL HPI/OVERNIGHT EVENTS: No acute overnight events. Pt complains of back pain 8/10 non radiating, no alleviating or worsening factors. She also complains of nausea and epigastric pain. She denies any chest pain, shortness of breath, palpitations, dysuria, fecal incontinence, or saddle anesthesia. She denies any vomiting, hematemesis, melena, or hematochezia.    REVIEW OF SYSTEMS:  CONSTITUTIONAL: No fever, weight loss, +fatigue  EYES: kernicterus  ENMT:  No difficulty hearing, tinnitus, vertigo; No sinus or throat pain  NECK: No pain or stiffness  BREASTS: No pain, masses, or nipple discharge  RESPIRATORY: No cough, wheezing, chills or hemoptysis; No shortness of breath  CARDIOVASCULAR: No chest pain, palpitations, dizziness, or leg swelling  GASTROINTESTINAL: +abdominal or epigastric pain. +nausea, vomiting, or hematemesis; No diarrhea or constipation. No melena or hematochezia.  GENITOURINARY: No dysuria, frequency, hematuria, or incontinence  NEUROLOGICAL: No headaches, memory loss, loss of strength, numbness, or tremors  SKIN: No itching, burning, rashes, or lesions   LYMPH NODES: No enlarged glands  ENDOCRINE: No heat or cold intolerance; No hair loss  MUSCULOSKELETAL: +back pain, No muscle, back, or extremity pain  PSYCHIATRIC: No depression, anxiety, mood swings, or difficulty sleeping  HEME/LYMPH: No easy bruising, or bleeding gums  ALLERY AND IMMUNOLOGIC: No hives or eczema  FAMILY HISTORY:    T(C): 36.2 (09-06-21 @ 05:38), Max: 36.5 (09-05-21 @ 20:19)  HR: 57 (09-06-21 @ 05:38) (50 - 57)  BP: 130/63 (09-06-21 @ 05:38) (130/63 - 164/72)  RR: 18 (09-06-21 @ 05:38) (18 - 18)  SpO2: --  Wt(kg): --Vital Signs Last 24 Hrs  T(C): 36.2 (06 Sep 2021 05:38), Max: 36.5 (05 Sep 2021 20:19)  T(F): 97.2 (06 Sep 2021 05:38), Max: 97.7 (05 Sep 2021 20:19)  HR: 57 (06 Sep 2021 05:38) (50 - 57)  BP: 130/63 (06 Sep 2021 05:38) (130/63 - 164/72)  BP(mean): --  RR: 18 (06 Sep 2021 05:38) (18 - 18)  SpO2: --  No Known Allergies      PHYSICAL EXAM:  GENERAL: NAD, well-groomed, well-developed  HEAD:  Atraumatic, Normocephalic  EYES: EOMI, PERRLA, conjunctiva and sclera clear  ENMT: No tonsillar erythema, exudates, or enlargement; Moist mucous membranes, Good dentition, No lesions  NECK: Supple, No JVD, Normal thyroid  NERVOUS SYSTEM:  Alert & Oriented X3, Good concentration; Motor Strength 5/5 B/L upper and lower extremities; DTRs 2+ intact and symmetric  CHEST/LUNG: Clear to percussion bilaterally; No rales, rhonchi, wheezing, or rubs  HEART: Regular rate and rhythm; No murmurs, rubs, or gallops  ABDOMEN: Soft, Nondistended, tender in the epigastric region.  Bowel sounds present,  EXTREMITIES:  2+ Peripheral Pulses, No clubbing, cyanosis, or edema, spinal tenderness in the thoracolumbar area   LYMPH: No lymphadenopathy noted  SKIN: No rashes or lesions    Consultant(s) Notes Reviewed:  [x ] YES  [ ] NO  Care Discussed with Consultants/Other Providers [ x] YES  [ ] NO    LABS:                          15.0   9.04  )-----------( 254      ( 06 Sep 2021 05:25 )             45.4     09-06    137  |  98  |  13  ----------------------------<  166<H>  4.5   |  28  |  0.6<L>    Ca    9.0      06 Sep 2021 05:25  Mg     2.1     09-06    TPro  6.5  /  Alb  3.9  /  TBili  8.2<H>  /  DBili  x   /  AST  343<H>  /  ALT  587<H>  /  AlkPhos  444<H>  09-06  Anion Gap, Serum: 11 mmol/L (09.06.21 @ 05:25)   Anion Gap, Serum: 13 mmol/L (09.05.21 @ 11:00)   Anion Gap, Serum: 14 mmol/L (09.05.21 @ 08:13)   Anion Gap, Serum: 13 mmol/L (09.04.21 @ 11:41)   Anion Gap, Serum: 14 mmol/L (09.04.21 @ 05:51)   Anion Gap, Serum: 11 mmol/L (09.04.21 @ 03:49)   Anion Gap, Serum: 21 mmol/L (09.03.21 @ 22:51)   Anion Gap, Serum: 15 mmol/L (09.03.21 @ 18:17) Lipase, Serum (09.03.21 @ 18:17)   Lipase, Serum: 28 U/L   POCT Blood Glucose.: 151 mg/dL (06 Sep 2021 07:26)  POCT Blood Glucose.: 129 mg/dL (05 Sep 2021 21:37)  POCT Blood Glucose.: 131 mg/dL (05 Sep 2021 16:37)  A1C with Estimated Average Glucose Result: 8.7: Method: Immunoassay   RADIOLOGY & ADDITIONAL TESTS:    < from: US Abdomen Upper Quadrant Right (09.05.21 @ 16:46) >  Post cholecystectomy. Diffuse biliary ductal dilatation, common bile duct measuring 1.0 cm. Findings are unchanged from referenced CT.    Hepatic steatosis.    < from: CT Angio Abdomen and Pelvis VIOLETTA w/ IV Cont (09.03.21 @ 22:52) >  No CT evidence of aortic dissection or intramural hematoma.    No CT evidence of acute intrathoracic or intra-abdominal pathology.    Mild atherosclerotic changes of the distal aorta.    2 mm right lung middle lobe nodule. In low riskpatients, no routine follow-up required. In high-risk patients optional CT at 12 months is recommended.    < end of copied text >    < end of copied text >      Imaging Personally Reviewed:  [ ] YES  [ ] NO  aspirin  chewable 81 milliGRAM(s) Oral daily  chlorhexidine 4% Liquid 1 Application(s) Topical <User Schedule>  dextrose 40% Gel 15 Gram(s) Oral once  dextrose 5%. 1000 milliLiter(s) IV Continuous <Continuous>  dextrose 5%. 1000 milliLiter(s) IV Continuous <Continuous>  dextrose 50% Injectable 25 Gram(s) IV Push once  dextrose 50% Injectable 12.5 Gram(s) IV Push once  dextrose 50% Injectable 25 Gram(s) IV Push once  glucagon  Injectable 1 milliGRAM(s) IntraMuscular once  heparin   Injectable 5000 Unit(s) SubCutaneous every 12 hours  insulin glargine Injectable (LANTUS) 9 Unit(s) SubCutaneous every morning  insulin lispro (ADMELOG) corrective regimen sliding scale   SubCutaneous three times a day before meals  insulin lispro Injectable (ADMELOG) 3 Unit(s) SubCutaneous three times a day before meals  lisinopril 10 milliGRAM(s) Oral daily  metoclopramide 10 milliGRAM(s) Oral every 6 hours PRN  pantoprazole    Tablet 40 milliGRAM(s) Oral before breakfast  traMADol 25 milliGRAM(s) Oral four times a day PRN      HEALTH ISSUES - PROBLEM Dx:

## 2021-09-06 NOTE — PROGRESS NOTE ADULT - ASSESSMENT
70 y/o f w/ pmhx of hypothyroidism, GERD c/o epigastric pain, dyspepsia, and jaundice with n/v/ diaphoresis, in the setting of hyperglycemia and elevated ketone bodies all suggestive of a presentation of DKA diagnosis secondary to unknown DM. Pt also found to have transaminitis with hyperbilirubinemia and jaundice on exam:    # Epigastric pain   # NBNB emesis  # Hyperglycemia, HAGMA, and Ketonuria consistent with DKA for newly diagnosed Diabetes  - BG on admission ~500, AG 21, LA 2.2, B-hydroxybutrate 1.0,  A1c is 8.7  - s/p insulin gtt in unit and downgraded to floor  - c/w lantus 8u qAM, humalog 3u AC, SSI  - C/w Carb Consistent diet  - will need outpatient PMD, ophth, podiatry and endocrine referral set up  - Diabetes educator c/s placed  - Recommend atorvastatin 40mg daily  -POCT  - starting on ASA 81mg daily    # Transaminitis   #Obstructive Jaundice r/o gallstone pancreatitis vs choledocholithiasis vs pancreatic malignancy vs hepatitis  - AST: 335->417-->343  - ALT: 414->536-->587   - Tbili: 2.8-->3.1-->3.9-->8.2 uptrending   -RUQ U/S shows hepatic steatosis with diffuse biliary duct dilation, CBD 1cm  - F/u acute hep panel, GULSHAN, anti-mitochondria, anti-smooth, CMV, EBV, LDH, Iron profile + ferritin, ceruloplasmin, alpha-1 antitrypsin  -Ordered MRCP pt scheduled to go tonight  -Holding atorvastatin 40mg    # Accelerated HTN  - starting lisinopril 10mg as above  - starting low and slow as patient has never been on anti hypertensive therapy. will titrate up lisinopril as needed    #GERD  -C/w PPI    #Hypothyroidism:  - F/u TSH, T4  -    DVT PPX  GI PPX  CHG  FULL CODE    #Progress Note Handoff  Pending (specify):  DM educator, Hepatitis work up, GI f/up  Family discussion: patient updated. in agreement of plan. all questions answered. Updated daughter Anna(PICU nurse in [place]), [phone] and Angélica [phone]  Disposition: Unknown at this time________

## 2021-09-06 NOTE — PROGRESS NOTE ADULT - ASSESSMENT
72 y/o f w/ pmhx of hypothyroidism not complaint with her synthroid, has not follow up with her physician in year, GERD presented with epigastric pain and acid taste in mouth for last 3 days. PT was admitted for new DKA s/p insulin drip and LFTs were noted to be elevated on admission. GI was consulted for eval    #Moderate Acute vs Chronic? Liver Injury - unclear etiology - must r/o viral hepatitis vs AIH vs Toxins vs DILI  #High likelihood for choledocholithiasis  - no evidence of sepsis  - LFTs: 2.8/574/350/449 -> 7.4/446/336/583 > 8.2  - CTAP: s/p CCy w/ intrahepatic dilation. CBD 9mm  - Medications reviewed: new hepatotoxic meds: lipitor and Tylenol  - Denies etoh or illicit drug use or herbal mediations. no family hx of GI cancers, autoimmune disorders. pt is not on any prescription medications at home  - R Ratio - 2.3    Rec  - f/u MRCP  - please obtain daily LFTS and coagulation profile  - f/u CLD workup: Hepatitis A IgM, Hepatitis B core IgM, core Ab total, surface Ag, surface Ab, HCV antibody, HCV RNA, Anti HEV, Iron studies and ceruloplasmin, Quantiferon level, GULSHAN, AMA, anti-Smooth Muscle Ab type 1, Anti liver-kidney microsomal Ab, Anti-soluble liver Ag, immunoglobulin panel, CMV PCR, EBV PCR, HSV IgM, Serum Drug screen and Utox  - Please avoid hepatotoxic medications

## 2021-09-06 NOTE — PROGRESS NOTE ADULT - ASSESSMENT
72 y/o f w/ pmhx of hypothyroidism not complaint with her synthroid, has not follow up with her physician in year, GERD presented with epigastric pain and acid taste in mouth for  associated with nausea and vomiting, nbnb, diaphoresis, in the setting of hyperglycemia and elevated ketone bodies all suggestive of a presentation of DKA diagnosis secondary to unknown DM was made in ER and patient received an insulin IV bolus. She was started on insulin gtt and referred to ICU for admission.       #DKA - resolved  - BG on admission ~500, AG 21, LA 2.2, B-hydroxybutrate 1.0  - no formal dx of DM in past but has not been to PCP reportedly >10 years  - A1c is 8.7  - s/p insulin gtt, gap closed and BG well controlled  - c/w lantus 9u qAM, humalog 3u AC, SSI  - Endocrinology following   - Carb Consistent diet  - will need outpatient PMD, ophth, podiatry and endocrine referral set up  - Diabetes education and insulin education on discharge   - starting on lisinopril 10mg daily  - starting on ASA 81mg daily  - holding statin for now due to transaminitis     # Accelerated HTN-improved   - starting lisinopril 10mg, titrate as needed     # Acute vs Chronic Liver Injury   - unsure etiology, need to r/o choledocholithiasis, hepatitis, DILI   - , , Bili 8.2  - GI following: acute liver failure workup/hepatitis workup ordered   - US with diffuse biliary ductal dilatation   - MRCP pending    #Leukocytosis- resolved    #GERD- C/w PPI    #Hypothyroidism- pend TSH to eval need for medication    #Progress Note Handoff  Pending (specify):  MRCP and GI workup   Family discussion: patient updated and daughter at bedside updated   Disposition: Unknown at this time_____x___        Rosina Rand, DO

## 2021-09-07 LAB
ALBUMIN SERPL ELPH-MCNC: 3.9 G/DL — SIGNIFICANT CHANGE UP (ref 3.5–5.2)
ALP SERPL-CCNC: 417 U/L — HIGH (ref 30–115)
ALT FLD-CCNC: 675 U/L — HIGH (ref 0–41)
ANA TITR SER: NEGATIVE — SIGNIFICANT CHANGE UP
ANION GAP SERPL CALC-SCNC: 12 MMOL/L — SIGNIFICANT CHANGE UP (ref 7–14)
ANION GAP SERPL CALC-SCNC: 13 MMOL/L — SIGNIFICANT CHANGE UP (ref 7–14)
AST SERPL-CCNC: 371 U/L — HIGH (ref 0–41)
BASOPHILS # BLD AUTO: 0.02 K/UL — SIGNIFICANT CHANGE UP (ref 0–0.2)
BASOPHILS # BLD AUTO: 0.02 K/UL — SIGNIFICANT CHANGE UP (ref 0–0.2)
BASOPHILS NFR BLD AUTO: 0.2 % — SIGNIFICANT CHANGE UP (ref 0–1)
BASOPHILS NFR BLD AUTO: 0.3 % — SIGNIFICANT CHANGE UP (ref 0–1)
BILIRUB SERPL-MCNC: 5.4 MG/DL — HIGH (ref 0.2–1.2)
BUN SERPL-MCNC: 14 MG/DL — SIGNIFICANT CHANGE UP (ref 10–20)
BUN SERPL-MCNC: 18 MG/DL — SIGNIFICANT CHANGE UP (ref 10–20)
CALCIUM SERPL-MCNC: 9 MG/DL — SIGNIFICANT CHANGE UP (ref 8.5–10.1)
CALCIUM SERPL-MCNC: 9.4 MG/DL — SIGNIFICANT CHANGE UP (ref 8.5–10.1)
CHLORIDE SERPL-SCNC: 98 MMOL/L — SIGNIFICANT CHANGE UP (ref 98–110)
CHLORIDE SERPL-SCNC: 99 MMOL/L — SIGNIFICANT CHANGE UP (ref 98–110)
CO2 SERPL-SCNC: 25 MMOL/L — SIGNIFICANT CHANGE UP (ref 17–32)
CO2 SERPL-SCNC: 29 MMOL/L — SIGNIFICANT CHANGE UP (ref 17–32)
CREAT SERPL-MCNC: 0.6 MG/DL — LOW (ref 0.7–1.5)
CREAT SERPL-MCNC: 0.6 MG/DL — LOW (ref 0.7–1.5)
EOSINOPHIL # BLD AUTO: 0.02 K/UL — SIGNIFICANT CHANGE UP (ref 0–0.7)
EOSINOPHIL # BLD AUTO: 0.02 K/UL — SIGNIFICANT CHANGE UP (ref 0–0.7)
EOSINOPHIL NFR BLD AUTO: 0.2 % — SIGNIFICANT CHANGE UP (ref 0–8)
EOSINOPHIL NFR BLD AUTO: 0.3 % — SIGNIFICANT CHANGE UP (ref 0–8)
GLUCOSE BLDC GLUCOMTR-MCNC: 118 MG/DL — HIGH (ref 70–99)
GLUCOSE BLDC GLUCOMTR-MCNC: 119 MG/DL — HIGH (ref 70–99)
GLUCOSE BLDC GLUCOMTR-MCNC: 65 MG/DL — LOW (ref 70–99)
GLUCOSE BLDC GLUCOMTR-MCNC: 85 MG/DL — SIGNIFICANT CHANGE UP (ref 70–99)
GLUCOSE BLDC GLUCOMTR-MCNC: 89 MG/DL — SIGNIFICANT CHANGE UP (ref 70–99)
GLUCOSE SERPL-MCNC: 55 MG/DL — LOW (ref 70–99)
GLUCOSE SERPL-MCNC: 80 MG/DL — SIGNIFICANT CHANGE UP (ref 70–99)
HAV IGM SER-ACNC: SIGNIFICANT CHANGE UP
HBV CORE AB SER-ACNC: SIGNIFICANT CHANGE UP
HBV CORE IGM SER-ACNC: SIGNIFICANT CHANGE UP
HBV CORE IGM SER-ACNC: SIGNIFICANT CHANGE UP
HBV SURFACE AB SER-ACNC: SIGNIFICANT CHANGE UP
HBV SURFACE AG SER-ACNC: SIGNIFICANT CHANGE UP
HCT VFR BLD CALC: 44.8 % — SIGNIFICANT CHANGE UP (ref 37–47)
HCT VFR BLD CALC: 45 % — SIGNIFICANT CHANGE UP (ref 37–47)
HGB BLD-MCNC: 14.8 G/DL — SIGNIFICANT CHANGE UP (ref 12–16)
HGB BLD-MCNC: 14.9 G/DL — SIGNIFICANT CHANGE UP (ref 12–16)
IMM GRANULOCYTES NFR BLD AUTO: 0.8 % — HIGH (ref 0.1–0.3)
IMM GRANULOCYTES NFR BLD AUTO: 0.8 % — HIGH (ref 0.1–0.3)
INR BLD: 1.03 RATIO — SIGNIFICANT CHANGE UP (ref 0.65–1.3)
LYMPHOCYTES # BLD AUTO: 1.34 K/UL — SIGNIFICANT CHANGE UP (ref 1.2–3.4)
LYMPHOCYTES # BLD AUTO: 11 % — LOW (ref 20.5–51.1)
LYMPHOCYTES # BLD AUTO: 2.13 K/UL — SIGNIFICANT CHANGE UP (ref 1.2–3.4)
LYMPHOCYTES # BLD AUTO: 32.1 % — SIGNIFICANT CHANGE UP (ref 20.5–51.1)
MAGNESIUM SERPL-MCNC: 2 MG/DL — SIGNIFICANT CHANGE UP (ref 1.8–2.4)
MAGNESIUM SERPL-MCNC: 2.2 MG/DL — SIGNIFICANT CHANGE UP (ref 1.8–2.4)
MCHC RBC-ENTMCNC: 28.9 PG — SIGNIFICANT CHANGE UP (ref 27–31)
MCHC RBC-ENTMCNC: 29.1 PG — SIGNIFICANT CHANGE UP (ref 27–31)
MCHC RBC-ENTMCNC: 33 G/DL — SIGNIFICANT CHANGE UP (ref 32–37)
MCHC RBC-ENTMCNC: 33.1 G/DL — SIGNIFICANT CHANGE UP (ref 32–37)
MCV RBC AUTO: 87.2 FL — SIGNIFICANT CHANGE UP (ref 81–99)
MCV RBC AUTO: 88 FL — SIGNIFICANT CHANGE UP (ref 81–99)
MONOCYTES # BLD AUTO: 0.52 K/UL — SIGNIFICANT CHANGE UP (ref 0.1–0.6)
MONOCYTES # BLD AUTO: 1.13 K/UL — HIGH (ref 0.1–0.6)
MONOCYTES NFR BLD AUTO: 7.8 % — SIGNIFICANT CHANGE UP (ref 1.7–9.3)
MONOCYTES NFR BLD AUTO: 9.2 % — SIGNIFICANT CHANGE UP (ref 1.7–9.3)
NEUTROPHILS # BLD AUTO: 3.89 K/UL — SIGNIFICANT CHANGE UP (ref 1.4–6.5)
NEUTROPHILS # BLD AUTO: 9.62 K/UL — HIGH (ref 1.4–6.5)
NEUTROPHILS NFR BLD AUTO: 58.7 % — SIGNIFICANT CHANGE UP (ref 42.2–75.2)
NEUTROPHILS NFR BLD AUTO: 78.6 % — HIGH (ref 42.2–75.2)
NRBC # BLD: 0 /100 WBCS — SIGNIFICANT CHANGE UP (ref 0–0)
NRBC # BLD: 0 /100 WBCS — SIGNIFICANT CHANGE UP (ref 0–0)
PLATELET # BLD AUTO: 277 K/UL — SIGNIFICANT CHANGE UP (ref 130–400)
PLATELET # BLD AUTO: 297 K/UL — SIGNIFICANT CHANGE UP (ref 130–400)
POTASSIUM SERPL-MCNC: 3.4 MMOL/L — LOW (ref 3.5–5)
POTASSIUM SERPL-MCNC: 4 MMOL/L — SIGNIFICANT CHANGE UP (ref 3.5–5)
POTASSIUM SERPL-SCNC: 3.4 MMOL/L — LOW (ref 3.5–5)
POTASSIUM SERPL-SCNC: 4 MMOL/L — SIGNIFICANT CHANGE UP (ref 3.5–5)
PROT SERPL-MCNC: 6.6 G/DL — SIGNIFICANT CHANGE UP (ref 6–8)
PROTHROM AB SERPL-ACNC: 11.8 SEC — SIGNIFICANT CHANGE UP (ref 9.95–12.87)
RBC # BLD: 5.09 M/UL — SIGNIFICANT CHANGE UP (ref 4.2–5.4)
RBC # BLD: 5.16 M/UL — SIGNIFICANT CHANGE UP (ref 4.2–5.4)
RBC # FLD: 14.2 % — SIGNIFICANT CHANGE UP (ref 11.5–14.5)
RBC # FLD: 14.3 % — SIGNIFICANT CHANGE UP (ref 11.5–14.5)
SODIUM SERPL-SCNC: 136 MMOL/L — SIGNIFICANT CHANGE UP (ref 135–146)
SODIUM SERPL-SCNC: 140 MMOL/L — SIGNIFICANT CHANGE UP (ref 135–146)
TSH SERPL-MCNC: 17.95 UIU/ML — HIGH (ref 0.27–4.2)
WBC # BLD: 12.23 K/UL — HIGH (ref 4.8–10.8)
WBC # BLD: 6.63 K/UL — SIGNIFICANT CHANGE UP (ref 4.8–10.8)
WBC # FLD AUTO: 12.23 K/UL — HIGH (ref 4.8–10.8)
WBC # FLD AUTO: 6.63 K/UL — SIGNIFICANT CHANGE UP (ref 4.8–10.8)

## 2021-09-07 PROCEDURE — 99232 SBSQ HOSP IP/OBS MODERATE 35: CPT

## 2021-09-07 PROCEDURE — 99233 SBSQ HOSP IP/OBS HIGH 50: CPT

## 2021-09-07 RX ORDER — LISINOPRIL 2.5 MG/1
20 TABLET ORAL DAILY
Refills: 0 | Status: DISCONTINUED | OUTPATIENT
Start: 2021-09-07 | End: 2021-09-09

## 2021-09-07 RX ADMIN — Medication 81 MILLIGRAM(S): at 12:23

## 2021-09-07 RX ADMIN — HEPARIN SODIUM 5000 UNIT(S): 5000 INJECTION INTRAVENOUS; SUBCUTANEOUS at 06:17

## 2021-09-07 RX ADMIN — INSULIN GLARGINE 9 UNIT(S): 100 INJECTION, SOLUTION SUBCUTANEOUS at 07:44

## 2021-09-07 RX ADMIN — Medication 3 UNIT(S): at 12:15

## 2021-09-07 RX ADMIN — Medication 3 UNIT(S): at 16:57

## 2021-09-07 RX ADMIN — PANTOPRAZOLE SODIUM 40 MILLIGRAM(S): 20 TABLET, DELAYED RELEASE ORAL at 06:18

## 2021-09-07 RX ADMIN — CHLORHEXIDINE GLUCONATE 1 APPLICATION(S): 213 SOLUTION TOPICAL at 05:19

## 2021-09-07 RX ADMIN — Medication 3 UNIT(S): at 07:43

## 2021-09-07 RX ADMIN — HEPARIN SODIUM 5000 UNIT(S): 5000 INJECTION INTRAVENOUS; SUBCUTANEOUS at 17:03

## 2021-09-07 RX ADMIN — LISINOPRIL 10 MILLIGRAM(S): 2.5 TABLET ORAL at 06:18

## 2021-09-07 NOTE — PROGRESS NOTE ADULT - SUBJECTIVE AND OBJECTIVE BOX
RUBIN TABOR  71y  Female      Patient is a 71y old  Female who presents with a chief complaint of DKA (07 Sep 2021 09:30)      INTERVAL HPI/OVERNIGHT EVENTS: No acute overnight events. Pt reports she's feeling better this morning with improved abdominal pain and nausea. She's tolerating po intake and is on carb consistent diet. No fever, chills, chest pain, shortness of breath, palpitations, hematemesis, hematochezia, and melena.      REVIEW OF SYSTEMS:  CONSTITUTIONAL: No fever, weight loss, or fatigue  EYES: No eye pain, visual disturbances, or discharge  ENMT:  No difficulty hearing, tinnitus, vertigo; No sinus or throat pain  NECK: No pain or stiffness  BREASTS: No pain, masses, or nipple discharge  RESPIRATORY: No cough, wheezing, chills or hemoptysis; No shortness of breath  CARDIOVASCULAR: No chest pain, palpitations, dizziness, or leg swelling  GASTROINTESTINAL: No abdominal or epigastric pain. No nausea, vomiting, or hematemesis; No diarrhea or constipation. No melena or hematochezia.  GENITOURINARY: No dysuria, frequency, hematuria, or incontinence  NEUROLOGICAL: No headaches, memory loss, loss of strength, numbness, or tremors  SKIN: No itching, burning, rashes, or lesions   LYMPH NODES: No enlarged glands  ENDOCRINE: No heat or cold intolerance; No hair loss  MUSCULOSKELETAL: No joint pain or swelling; No muscle, back, or extremity pain  PSYCHIATRIC: No depression, anxiety, mood swings, or difficulty sleeping  HEME/LYMPH: No easy bruising, or bleeding gums  ALLERY AND IMMUNOLOGIC: No hives or eczema  FAMILY HISTORY:    T(C): 36.4 (09-07-21 @ 06:01), Max: 36.4 (09-07-21 @ 06:01)  HR: 50 (09-07-21 @ 06:01) (50 - 59)  BP: 132/63 (09-07-21 @ 06:01) (132/60 - 166/74)  RR: 18 (09-07-21 @ 06:01) (18 - 18)  SpO2: --  Wt(kg): --Vital Signs Last 24 Hrs  T(C): 36.4 (07 Sep 2021 06:01), Max: 36.4 (07 Sep 2021 06:01)  T(F): 97.6 (07 Sep 2021 06:01), Max: 97.6 (07 Sep 2021 06:01)  HR: 50 (07 Sep 2021 06:01) (50 - 59)  BP: 132/63 (07 Sep 2021 06:01) (132/60 - 166/74)  BP(mean): --  RR: 18 (07 Sep 2021 06:01) (18 - 18)  SpO2: --  No Known Allergies      PHYSICAL EXAM:  GENERAL: NAD, well-groomed, well-developed  HEAD:  Atraumatic, Normocephalic  EYES: EOMI, PERRLA, conjunctiva and sclera clear  ENMT: No tonsillar erythema, exudates, or enlargement; Moist mucous membranes, Good dentition, No lesions  NECK: Supple, No JVD, Normal thyroid  NERVOUS SYSTEM:  Alert & Oriented X3, Good concentration; Motor Strength 5/5 B/L upper and lower extremities; DTRs 2+ intact and symmetric  CHEST/LUNG: Clear to percussion bilaterally; No rales, rhonchi, wheezing, or rubs  HEART: Regular rate and rhythm; No murmurs, rubs, or gallops  ABDOMEN: Soft, Nontender, Nondistended; Bowel sounds present  EXTREMITIES:  2+ Peripheral Pulses, No clubbing, cyanosis, or edema  LYMPH: No lymphadenopathy noted  SKIN: No rashes or lesions    Consultant(s) Notes Reviewed:  [x ] YES  [ ] NO  Care Discussed with Consultants/Other Providers [ x] YES  [ ] NO    LABS:                        14.9   6.63  )-----------( 277      ( 07 Sep 2021 05:40 )             45.0   09-07    140  |  99  |  14  ----------------------------<  80  4.0   |  29  |  0.6<L>    Ca    9.0      07 Sep 2021 05:40  Mg     2.2     09-07    TPro  6.6  /  Alb  3.9  /  TBili  5.4<H>  /  DBili  x   /  AST  371<H>  /  ALT  675<H>  /  AlkPhos  417<H>  09-07    Acute Hepatitis Panel (09.05.21 @ 11:00)   Hepatitis C Virus Interpretation: Nonreact: Hepatitis C AB   S/CO Ratio Interpretation   < 1.00 Non-Reactive   1.00 - 4.99 Weakly-Reactive   >= 5.00 Reactive   Non-Reactive: A person witha non-reactive HCV antibody result is   considered uninfected. No further action is needed unless recent   infection is suspected. In these cases, consider repeat testing later to   detect seroconversion..   Weakly-Reactive: HCV antibody test is abnormal, HCV RNA Qualitative test   will follow.   Reactive: HCV antibody test is abnormal, HCV RNA Qualitative test will   follow.   Note: HCV antibody testing is performed on the [employer]  system.   Hepatitis C Virus S/CO Ratio: 0.14 S/CO   Hepatitis B Core IgM Antibody: Nonreact   Hepatitis B Surface Antigen: Nonreact   Hepatitis A IgM Antibody: Nonreact       RADIOLOGY & ADDITIONAL TESTS:    < from: MR Abdomen w/wo IV Cont (09.06.21 @ 18:58) >  FINDINGS:    HEPATOBILIARY: Status post cholecystectomy. CBD dilated to 1.1 cm. Distal CBD stone measuring up to 7 mm (9/58).      Imaging Personally Reviewed:  [ ] YES  [ ] NO  aspirin  chewable 81 milliGRAM(s) Oral daily  chlorhexidine 4% Liquid 1 Application(s) Topical <User Schedule>  dextrose 40% Gel 15 Gram(s) Oral once  dextrose 5%. 1000 milliLiter(s) IV Continuous <Continuous>  dextrose 5%. 1000 milliLiter(s) IV Continuous <Continuous>  dextrose 50% Injectable 25 Gram(s) IV Push once  dextrose 50% Injectable 12.5 Gram(s) IV Push once  dextrose 50% Injectable 25 Gram(s) IV Push once  glucagon  Injectable 1 milliGRAM(s) IntraMuscular once  heparin   Injectable 5000 Unit(s) SubCutaneous every 12 hours  insulin glargine Injectable (LANTUS) 9 Unit(s) SubCutaneous every morning  insulin lispro (ADMELOG) corrective regimen sliding scale   SubCutaneous three times a day before meals  insulin lispro Injectable (ADMELOG) 3 Unit(s) SubCutaneous three times a day before meals  lisinopril 10 milliGRAM(s) Oral daily  pantoprazole    Tablet 40 milliGRAM(s) Oral before breakfast  traMADol 25 milliGRAM(s) Oral four times a day PRN      HEALTH ISSUES - PROBLEM Dx:

## 2021-09-07 NOTE — PROGRESS NOTE ADULT - ASSESSMENT
72 y/o f w/ pmhx of hypothyroidism, GERD c/o epigastric pain, dyspepsia, and jaundice with n/v/ diaphoresis, in the setting of hyperglycemia and elevated ketone bodies all suggestive of a presentation of DKA diagnosis secondary to unknown DM. Pt also found to have transaminitis with hyperbilirubinemia and jaundice on exam:    # Epigastric pain   # NBNB emesis  # Hyperglycemia, HAGMA, and Ketonuria consistent with DKA for newly diagnosed Diabetes RESOLVED  - BG on admission ~500, AG 21, LA 2.2, B-hydroxybutrate 1.0,  A1c is 8.7  - s/p insulin gtt in unit and downgraded to floor  -Fingerstick controlled 120s-150s  - c/w lantus 9u qAM, humalog 3u AC, SSI  - C/w Carb Consistent diet  - will need outpatient PMD, ophth, podiatry and endocrine referral set up  - Diabetes educator c/s placed  - Recommend atorvastatin 40mg daily  -POCT  - starting on ASA 81mg daily    # Transaminitis   #Obstructive Jaundice r/o gallstone pancreatitis vs choledocholithiasis vs pancreatic malignancy vs hepatitis  - AST: 335->417-->343-->371  - ALT: 414->536-->587-->675  - Tbili: 2.8-->3.1-->3.9-->8.2-->5.4  -RUQ U/S shows hepatic steatosis with diffuse biliary duct dilation, CBD 1cm  - F/u acute hep panel, GULSHAN, anti-mitochondria, anti-smooth, CMV, EBV, LDH, Iron profile + ferritin, ceruloplasmin, alpha-1 antitrypsin> Labs negative for Hep B, C, A, Hemochromatosis, and Yair disease. F/u on anti mitochondria, alpha 1 antitrypsin  -F/u PT/PTT  -MRCP- Distal CBD dilation of 1.1cm with 7mm obstructive stone, will f/u with GI for ERCP  -Holding atorvastatin 40mg    # Accelerated HTN- BP stable 132/63  - starting lisinopril 10mg as above  - starting low and slow as patient has never been on anti hypertensive therapy. will titrate up lisinopril as needed    #GERD  -C/w PPI    #Hypothyroidism:  - F/u TSH, T4  -    DVT PPX  GI PPX  CHG  FULL CODE    #Progress Note Handoff  Pending (specify):  DM educator, Hepatitis work up, GI f/up  Family discussion: patient updated. in agreement of plan. all questions answered. Updated daughter Anna(PICU nurse in [place]), [phone] and Angélica [phone]  Disposition: Unknown at this time________       70 y/o f w/ pmhx of hypothyroidism, GERD c/o epigastric pain, dyspepsia, and jaundice with n/v/ diaphoresis, in the setting of hyperglycemia and elevated ketone bodies all suggestive of a presentation of DKA diagnosis secondary to unknown DM. Pt also found to have transaminitis with hyperbilirubinemia and jaundice on exam:    # Epigastric pain   # NBNB emesis  # Hyperglycemia, HAGMA, and Ketonuria consistent with DKA for newly diagnosed Diabetes RESOLVED  - BG on admission ~500, AG 21, LA 2.2, B-hydroxybutrate 1.0,  A1c is 8.7  - s/p insulin gtt in unit and downgraded to floor  -Fingerstick controlled 120s-150s  - c/w lantus 9u qAM, humalog 3u AC, SSI  - C/w Carb Consistent diet  - will need outpatient PMD, ophth, podiatry and endocrine referral set up  - Diabetes educator c/s placed  - Recommend atorvastatin 40mg daily  -POCT  - starting on ASA 81mg daily    # Transaminitis   #Obstructive Jaundice r/o gallstone pancreatitis vs choledocholithiasis vs pancreatic malignancy vs hepatitis  - AST: 335->417-->343-->371  - ALT: 414->536-->587-->675  - Tbili: 2.8-->3.1-->3.9-->8.2-->5.4  -RUQ U/S shows hepatic steatosis with diffuse biliary duct dilation, CBD 1cm  - F/u acute hep panel, GULSHAN, anti-mitochondria, anti-smooth, CMV, EBV, LDH, Iron profile + ferritin, ceruloplasmin, alpha-1 antitrypsin> Labs negative for Hep B, C, A, Hemochromatosis, and Yair disease. F/u on anti mitochondria, alpha 1 antitrypsin  -F/u PT/PTT  -MRCP- Distal CBD dilation of 1.1cm with 7mm obstructive stone, plan for ERCP with advanced GI on 9/8  -Holding atorvastatin 40mg    # Accelerated HTN- BP stable 132/63  - started on lisinopril 10mg--> upgraded to 20mg q24h  - starting low and slow as patient has never been on anti hypertensive therapy. will titrate up lisinopril as needed    #GERD  -C/w PPI    #Hypothyroidism:  - F/u TSH, T4  -    DVT PPX  GI PPX  CHG  FULL CODE    #Progress Note Handoff  Pending (specify):  DM educator, ERCP  Family discussion: patient updated. in agreement of plan. all questions answered. Updated daughter Anna(PICU nurse in [place]), [phone] and Angélica [phone]  Disposition: Unknown at this time________

## 2021-09-07 NOTE — PROGRESS NOTE ADULT - ASSESSMENT
70 y/o f w/ pmhx of hypothyroidism not complaint with her synthroid, has not follow up with her physician in year, GERD presented with epigastric pain and acid taste in mouth for  associated with nausea and vomiting, nbnb, diaphoresis, in the setting of hyperglycemia and elevated ketone bodies all suggestive of a presentation of DKA diagnosis secondary to unknown DM was made in ER and patient received an insulin IV bolus. She was started on insulin gtt and referred to ICU for admission.     # Transaminitis  - unsure etiology  - AST: 335->417  - ALT: 414->536  - Tbili: 2.8-->3.1-->3.9  - ordering acute hep panel, GULSHAN, anti-mitochondria, anti-smooth, CMV, EBV, LDH, Iron profile + ferritin, ceruloplasmin, alpha-1 antitrypsin  - US and MRCP showing distended CBD with 1.1cm gall stone  - [facility] to take tomorrow for removal with ERCP    #Hypothyroidism:  - check TSH to assess need of medication, pt has been non compliant for years.    # Accelerated HTN  - increasing lisinopril 10mg-->20mg daily  - starting low and slow as patient has never been on anti hypertensive therapy. will titrate up lisinopril as needed      # Abdominal pain, RESOLVED  # NBNB emesis  # Elevated glucose  # HAGMA  # Ketonuria  - BG on admission ~500, AG 21, LA 2.2, B-hydroxybutrate 1.0  - no formal dx of DM in past but has not been to PCP reportedly >10 years  - A1c is 8.7  - admitted to the unit to be started on insulin gtt and IVF  - GAP closed to 13, BG <200; no downgraded to floor  - c/w lantus 9u qAM, humalog 3u AC, SSI  - Carb Consistent diet  - will need outpatient PMD, ophth, podiatry and endocrine referral set up  - Diabetes educator c/s placed  - starting on lisinopril 10mg daily  - starting on atorvastatin 40mg daily  - starting on ASA 81mg daily        #Leukocytosis, RESOLVED  - 8k-->12k-->8k-->6k  - likely reactive  - no focal signs/sx of infection  - afebrile  - continue to monitor  - hold abx for now    #GERD  C/w PPI    DVT PPX  GI PPX  CHG  FULL CODE    #Progress Note Handoff  Pending (specify):  DM educator, Hepatitis work up, GI f/up  Family discussion: patient updated. in agreement of plan. all questions answered. Updated daughter Anna(PICU nurse in [place]), [phone]  Disposition: Unknown at this time________

## 2021-09-07 NOTE — PROGRESS NOTE ADULT - ASSESSMENT
type 2 diabetes, and obesity under good control, will consider orals once jaundice resolves. please check free t4 and TSH.

## 2021-09-07 NOTE — PROGRESS NOTE ADULT - SUBJECTIVE AND OBJECTIVE BOX
Reason for Endocrinology Consult: Diabetes    HPI: 71y Female      PAST MEDICAL & SURGICAL HISTORY:    FAMILY HISTORY:      SH:  Smoking  Etoh:  Recreational Drugs:    Home Medications:      Current (Non-Endocrine) Meds:  aspirin  chewable 81 milliGRAM(s) Oral daily  chlorhexidine 4% Liquid 1 Application(s) Topical <User Schedule>  dextrose 5%. 1000 milliLiter(s) IV Continuous <Continuous>  dextrose 5%. 1000 milliLiter(s) IV Continuous <Continuous>  heparin   Injectable 5000 Unit(s) SubCutaneous every 12 hours  lisinopril 20 milliGRAM(s) Oral daily  pantoprazole    Tablet 40 milliGRAM(s) Oral before breakfast  traMADol 25 milliGRAM(s) Oral four times a day PRN      Current Endocrine Meds:   dextrose 40% Gel 15 Gram(s) Oral once  dextrose 50% Injectable 25 Gram(s) IV Push once  dextrose 50% Injectable 25 Gram(s) IV Push once  dextrose 50% Injectable 12.5 Gram(s) IV Push once  glucagon  Injectable 1 milliGRAM(s) IntraMuscular once  insulin glargine Injectable (LANTUS) 9 Unit(s) SubCutaneous every morning  insulin lispro (ADMELOG) corrective regimen sliding scale   SubCutaneous three times a day before meals  insulin lispro Injectable (ADMELOG) 3 Unit(s) SubCutaneous three times a day before meals      Allergies:  No Known Allergies      ROS:  Denies the following except as indicated.    General: weight loss/weight gain, decreased appetite, fatigue, fever  Eyes: blurry vision, double vision  ENT: neck swelling, dysphagia, voice changes   CV: palpitations, SOB, chest pain, cough  GI: nausea, vomiting, diarrhea, constipation, abdominal pain  : nocturia,  polyuria, dysuria  Endo: decreased libido, heat/cold intolerance, jitteriness  MSK: arthralgias, myalgias  Skin: rash, dryness, diaphoresis  Neuro: pedal numbness,pedal paresthesias, pedal pain    Height (cm): 160 (09-07 @ 03:49)  Weight (kg): 89.811 (09-03 @ 23:46)  BMI (kg/m2): 35.1 (09-07 @ 03:49)    Vital Signs Last 24 Hrs  T(C): 36.9 (07 Sep 2021 14:27), Max: 36.9 (07 Sep 2021 14:27)  T(F): 98.4 (07 Sep 2021 14:27), Max: 98.4 (07 Sep 2021 14:27)  HR: 54 (07 Sep 2021 14:27) (50 - 59)  BP: 150/67 (07 Sep 2021 14:27) (132/63 - 166/74)  BP(mean): --  RR: 18 (07 Sep 2021 14:27) (18 - 18)  SpO2: --  Constitutional: WN/WD in NAD.   Neck: no thyromegaly or palpable thyroid nodules   Respiratory: lungs CTAB.  Cardiovascular: regular rate and rhythm, normal S1 and S2, no audible murmurs  GI: soft, NT/ND, no masses/HSM appreciated.  Ext: no edema, no ulcers, pedal pulses palpable bilaterally  Neurology: no tremor, monofilament sensation intact in feet  Psychiatric: A&O x 3, normal affect/mood.        LABS:                        14.9   6.63  )-----------( 277      ( 07 Sep 2021 05:40 )             45.0     09-07    140  |  99  |  14  ----------------------------<  80  4.0   |  29  |  0.6<L>    Ca    9.0      07 Sep 2021 05:40  Mg     2.2     09-07    TPro  6.6  /  Alb  3.9  /  TBili  5.4<H>  /  DBili  x   /  AST  371<H>  /  ALT  675<H>  /  AlkPhos  417<H>  09-07    PT/INR - ( 07 Sep 2021 12:01 )   PT: 11.80 sec;   INR: 1.03 ratio                                            RADIOLOGY & ADDITIONAL STUDIES:

## 2021-09-07 NOTE — PROGRESS NOTE ADULT - SUBJECTIVE AND OBJECTIVE BOX
Gastroenterology progress note:     Patient is a 71y old  Female who presents with a chief complaint of DKA (06 Sep 2021 17:57)       Admitted on: 09-04-21    We are following the patient for abdominal pain and elevated lFT     Interval History:  feeling better abdominal pain improved. No nausea, vomiting.       PAST MEDICAL & SURGICAL HISTORY:      MEDICATIONS  (STANDING):  aspirin  chewable 81 milliGRAM(s) Oral daily  chlorhexidine 4% Liquid 1 Application(s) Topical <User Schedule>  dextrose 40% Gel 15 Gram(s) Oral once  dextrose 5%. 1000 milliLiter(s) (50 mL/Hr) IV Continuous <Continuous>  dextrose 5%. 1000 milliLiter(s) (100 mL/Hr) IV Continuous <Continuous>  dextrose 50% Injectable 25 Gram(s) IV Push once  dextrose 50% Injectable 12.5 Gram(s) IV Push once  dextrose 50% Injectable 25 Gram(s) IV Push once  glucagon  Injectable 1 milliGRAM(s) IntraMuscular once  heparin   Injectable 5000 Unit(s) SubCutaneous every 12 hours  insulin glargine Injectable (LANTUS) 9 Unit(s) SubCutaneous every morning  insulin lispro (ADMELOG) corrective regimen sliding scale   SubCutaneous three times a day before meals  insulin lispro Injectable (ADMELOG) 3 Unit(s) SubCutaneous three times a day before meals  lisinopril 10 milliGRAM(s) Oral daily  pantoprazole    Tablet 40 milliGRAM(s) Oral before breakfast    MEDICATIONS  (PRN):  traMADol 25 milliGRAM(s) Oral four times a day PRN Mild Pain (1 - 3)      Allergies  No Known Allergies      Review of Systems:   Cardiovascular:  No Chest Pain, No Palpitations  Respiratory:  No Cough, No Dyspnea  Gastrointestinal:  As described in HPI    Physical Examination:  T(C): 36.4 (09-07-21 @ 06:01), Max: 36.4 (09-07-21 @ 06:01)  HR: 50 (09-07-21 @ 06:01) (50 - 59)  BP: 132/63 (09-07-21 @ 06:01) (132/60 - 166/74)  RR: 18 (09-07-21 @ 06:01) (18 - 18)  SpO2: --      Constitutional: No acute distress.  Respiratory:  No signs of respiratory distress. Lung sounds are clear bilaterally.  Cardiovascular:  S1 S2, Regular rate and rhythm.  Abdominal: Abdomen is soft, symmetric, and non-tender without distention. There are no visible lesions or scars. Bowel sounds are present and normoactive in all four quadrants. No masses, hepatomegaly, or splenomegaly are noted.   Skin: No rashes, No Jaundice.        Data:                        14.9   6.63  )-----------( 277      ( 07 Sep 2021 05:40 )             45.0     Hgb trend:  14.9  09-07-21 @ 05:40  15.0  09-06-21 @ 05:25  14.9  09-05-21 @ 11:00  14.3  09-05-21 @ 08:13      09-07    140  |  99  |  14  ----------------------------<  80  4.0   |  29  |  0.6<L>    Ca    9.0      07 Sep 2021 05:40  Mg     2.2     09-07    TPro  6.6  /  Alb  3.9  /  TBili  5.4<H>  /  DBili  x   /  AST  371<H>  /  ALT  675<H>  /  AlkPhos  417<H>  09-07    Liver panel trend:  TBili 5.4   /      /      /   AlkP 417   /   Tptn 6.6   /   Alb 3.9    /   DBili --      09-07  TBili 8.2   /      /      /   AlkP 444   /   Tptn 6.5   /   Alb 3.9    /   DBili --      09-06  TBili 7.3   /      /      /   AlkP 441   /   Tptn 6.9   /   Alb 4.2    /   DBili 6.2      09-05  TBili 7.4   /      /      /   AlkP 446   /   Tptn 6.8   /   Alb 4.1    /   DBili --      09-05  TBili 3.9   /      /      /   AlkP 480   /   Tptn 6.6   /   Alb 4.0    /   DBili --      09-04  TBili 3.1   /      /      /   AlkP 469   /   Tptn 7.2   /   Alb 4.1    /   DBili --      09-03  TBili 2.8   /      /      /   AlkP 574   /   Tptn 7.6   /   Alb 4.4    /   DBili 1.7      09-03             Radiology:    US Abdomen Upper Quadrant Right:   EXAM:  US ABDOMEN RT UPR QUADRANT            PROCEDURE DATE:  09/05/2021            INTERPRETATION:  CLINICAL INFORMATION: Transaminitis    Correlation is made with CT abdomen and pelvis September 3, 2021    TECHNIQUE: Sonography of the right upperquadrant.    FINDINGS:    Liver: Intrahepatic biliary ductal dilatation. Diffuse increased echogenicity.  Bile ducts: Diffuse biliary ductal dilatation, common bile duct measuring 1.0 cm.  Gallbladder: Cholecystectomy.  Pancreas: Visualized portions are within normal limits.  Right kidney: 12.0 cm. No hydronephrosis.  Ascites: None.  IVC: Visualized portions are within normal limits.    IMPRESSION:    Post cholecystectomy. Diffuse biliary ductal dilatation, common bile duct measuring 1.0 cm. Findings are unchanged from referenced CT.    Hepatic steatosis.    --- End of Report ---              ELLIOT LANDAU MD; Attending Radiologist  This document has been electronically signed. Sep  6 2021  9:19AM (09-05-21 @ 16:46)

## 2021-09-07 NOTE — PROGRESS NOTE ADULT - ASSESSMENT
70 y/o f w/ pmhx of hypothyroidism not complaint with her synthroid, has not follow up with her physician in year, GERD presented with epigastric pain and acid taste in mouth for last 3 days. PT was admitted for new DKA s/p insulin drip and LFTs were noted to be elevated on admission. GI was consulted for eval    #)Transaminitis mixed pattern moderate DD: Likely choledocholithais vs DILI vs r/o other CLD  Improving Bili and ALP slightly worsened AST and ALT   -No baseline LFT admitted with elevated LFT  -no evidence of sepsis  -CTAP: s/p CCy w/ intrahepatic dilation. CBD 9mm  -Medications reviewed: new hepatotoxic meds: lipitor and Tylenol (she took almost 4gm of tylenol last month every other day) tylenol level <5  -Denies etoh or illicit drug use or herbal mediations.   -Hepatitis A, B, C negative, EBV past infection    Rec  -can have clear liquid diet  -P MRI   -please obtain daily LFTS including INR  -Please avoid hepatotoxic medications  -Follow up rest of the CLD work up 72 y/o f w/ pmhx of hypothyroidism not complaint with her synthroid, has not follow up with her physician in year, GERD presented with epigastric pain and acid taste in mouth for last 3 days. PT was admitted for new DKA s/p insulin drip and LFTs were noted to be elevated on admission. GI was consulted for eval    #)Transaminitis mixed pattern moderate DD: Likely choledocholithais vs DILI vs r/o other CLD  Improving Bili and ALP slightly worsened AST and ALT   -No baseline LFT admitted with elevated LFT  -no evidence of sepsis  -CTAP: s/p CCy w/ intrahepatic dilation. CBD 9mm  -Medications reviewed: new hepatotoxic meds: lipitor and Tylenol (she took almost 4gm of tylenol last month every other day) tylenol level <5  -Denies etoh or illicit drug use or herbal mediations.   -Hepatitis A, B, C negative, EBV past infection    Rec  -can have clear liquid diet  -MRI showed distal CBD stone; spoke to [facility] team likely ERCP tomorrow  -please obtain daily LFTS including INR  -Please avoid hepatotoxic medications  -Follow up rest of the CLD work up

## 2021-09-07 NOTE — PROGRESS NOTE ADULT - SUBJECTIVE AND OBJECTIVE BOX
RUBIN TABOR  71y  Female      Patient is a 71y old  Female who presents with a chief complaint of DKA (07 Sep 2021 11:20)      INTERVAL HPI/OVERNIGHT EVENTS: no acute events overnight. no major complaints/abdominal pain or n/v. no nursing concerns.       REVIEW OF SYSTEMS:  CONSTITUTIONAL: No fever, weight loss, or fatigue  RESPIRATORY: No cough, wheezing, chills or hemoptysis; No shortness of breath  CARDIOVASCULAR: No chest pain, palpitations, dizziness, or leg swelling  GASTROINTESTINAL: No abdominal or epigastric pain. No nausea, vomiting, or hematemesis; No diarrhea or constipation. No melena or hematochezia.  GENITOURINARY: No dysuria, frequency, hematuria, or incontinence  NEUROLOGICAL: No headaches, memory loss, loss of strength, numbness, or tremors  SKIN: No itching, burning, rashes, or lesions   MUSCULOSKELETAL: No joint pain or swelling; No muscle, back, or extremity pain  PSYCHIATRIC: No depression, anxiety, mood swings, or difficulty sleeping  All other review of systems negative    VITALS  T(C): 36.9 (09-07-21 @ 14:27), Max: 36.9 (09-07-21 @ 14:27)  HR: 54 (09-07-21 @ 14:27) (50 - 59)  BP: 150/67 (09-07-21 @ 14:27) (132/63 - 166/74)  RR: 18 (09-07-21 @ 14:27) (18 - 18)  SpO2: --  Wt(kg): --Vital Signs Last 24 Hrs  T(C): 36.9 (07 Sep 2021 14:27), Max: 36.9 (07 Sep 2021 14:27)  T(F): 98.4 (07 Sep 2021 14:27), Max: 98.4 (07 Sep 2021 14:27)  HR: 54 (07 Sep 2021 14:27) (50 - 59)  BP: 150/67 (07 Sep 2021 14:27) (132/63 - 166/74)  BP(mean): --  RR: 18 (07 Sep 2021 14:27) (18 - 18)  SpO2: --        PHYSICAL EXAM:  GENERAL: elderly F, NAD, non toxic appearing  EYES: anicteric sclera, non injected conjunctiva, EOMI  ENT: hearing grossly intact, oropharynx clear, MMM  PSYCH: no agitation, baseline mentation  NERVOUS SYSTEM:  Alert & Oriented X3, CN 2-12 grossly intact  PULMONARY: Clear to percussion bilaterally; No rales, rhonchi, wheezing, or rubs  CARDIOVASCULAR: Regular rate and rhythm; No murmurs, rubs, or gallops, no LE edema  GI: Soft, Nontender, Nondistended; Bowel sounds present  EXTREMITIES:  2+ Peripheral Pulses, No clubbing, cyanosis  LYMPH: No lymphadenopathy noted  SKIN: No rashes or lesions  Consultant(s) Notes Reviewed:  [x ] YES  [ ] NO    Discussed with Consultants/Other Providers [ x] YES     LABS                          14.9   6.63  )-----------( 277      ( 07 Sep 2021 05:40 )             45.0     09-07    140  |  99  |  14  ----------------------------<  80  4.0   |  29  |  0.6<L>    Ca    9.0      07 Sep 2021 05:40  Mg     2.2     09-07    TPro  6.6  /  Alb  3.9  /  TBili  5.4<H>  /  DBili  x   /  AST  371<H>  /  ALT  675<H>  /  AlkPhos  417<H>  09-07  PT/INR - ( 07 Sep 2021 12:01 )   PT: 11.80 sec;   INR: 1.03 ratio       Lactate Trend  09-03 @ 18:17 Lactate:2.4     POCT Blood Glucose.: 119 mg/dL (07 Sep 2021 11:36)      RADIOLOGY & ADDITIONAL TESTS:  MR Abdomen w/wo IV Cont (09.06.21 @ 18:58) >  IMPRESSION:  Status post cholecystectomy. Distal CBD stone identified. CBD dilated to 1.1 cm.    MEDICATIONS  (STANDING):  aspirin  chewable 81 milliGRAM(s) Oral daily  chlorhexidine 4% Liquid 1 Application(s) Topical <User Schedule>  dextrose 40% Gel 15 Gram(s) Oral once  dextrose 5%. 1000 milliLiter(s) (100 mL/Hr) IV Continuous <Continuous>  dextrose 5%. 1000 milliLiter(s) (50 mL/Hr) IV Continuous <Continuous>  dextrose 50% Injectable 25 Gram(s) IV Push once  dextrose 50% Injectable 12.5 Gram(s) IV Push once  dextrose 50% Injectable 25 Gram(s) IV Push once  glucagon  Injectable 1 milliGRAM(s) IntraMuscular once  heparin   Injectable 5000 Unit(s) SubCutaneous every 12 hours  insulin glargine Injectable (LANTUS) 9 Unit(s) SubCutaneous every morning  insulin lispro (ADMELOG) corrective regimen sliding scale   SubCutaneous three times a day before meals  insulin lispro Injectable (ADMELOG) 3 Unit(s) SubCutaneous three times a day before meals  lisinopril 10 milliGRAM(s) Oral daily  pantoprazole    Tablet 40 milliGRAM(s) Oral before breakfast    MEDICATIONS  (PRN):  traMADol 25 milliGRAM(s) Oral four times a day PRN Mild Pain (1 - 3)

## 2021-09-08 ENCOUNTER — TRANSCRIPTION ENCOUNTER (OUTPATIENT)
Age: 71
End: 2021-09-08

## 2021-09-08 ENCOUNTER — RESULT REVIEW (OUTPATIENT)
Age: 71
End: 2021-09-08

## 2021-09-08 LAB
GAMMA INTERFERON BACKGROUND BLD IA-ACNC: 0.01 IU/ML — SIGNIFICANT CHANGE UP
GLUCOSE BLDC GLUCOMTR-MCNC: 136 MG/DL — HIGH (ref 70–99)
GLUCOSE BLDC GLUCOMTR-MCNC: 151 MG/DL — HIGH (ref 70–99)
GLUCOSE BLDC GLUCOMTR-MCNC: 83 MG/DL — SIGNIFICANT CHANGE UP (ref 70–99)
GLUCOSE BLDC GLUCOMTR-MCNC: 90 MG/DL — SIGNIFICANT CHANGE UP (ref 70–99)
IGA FLD-MCNC: 428 MG/DL — SIGNIFICANT CHANGE UP (ref 84–499)
IGG FLD-MCNC: 1112 MG/DL — SIGNIFICANT CHANGE UP (ref 610–1660)
IGM SERPL-MCNC: 84 MG/DL — SIGNIFICANT CHANGE UP (ref 35–242)
KAPPA LC SER QL IFE: 1.31 MG/DL — SIGNIFICANT CHANGE UP (ref 0.33–1.94)
KAPPA/LAMBDA FREE LIGHT CHAIN RATIO, SERUM: 0.73 RATIO — SIGNIFICANT CHANGE UP (ref 0.26–1.65)
LAMBDA LC SER QL IFE: 1.8 MG/DL — SIGNIFICANT CHANGE UP (ref 0.57–2.63)
LKM AB SER-ACNC: <20.1 UNITS — SIGNIFICANT CHANGE UP (ref 0–20)
M TB IFN-G BLD-IMP: NEGATIVE — SIGNIFICANT CHANGE UP
M TB IFN-G CD4+ BCKGRND COR BLD-ACNC: 0 IU/ML — SIGNIFICANT CHANGE UP
M TB IFN-G CD4+CD8+ BCKGRND COR BLD-ACNC: 0 IU/ML — SIGNIFICANT CHANGE UP
QUANT TB PLUS MITOGEN MINUS NIL: 0.61 IU/ML — SIGNIFICANT CHANGE UP
T3 SERPL-MCNC: 95 NG/DL — SIGNIFICANT CHANGE UP (ref 80–200)
T4 AB SER-ACNC: 7.4 UG/DL — SIGNIFICANT CHANGE UP (ref 4.6–12)

## 2021-09-08 PROCEDURE — 99233 SBSQ HOSP IP/OBS HIGH 50: CPT

## 2021-09-08 PROCEDURE — 88112 CYTOPATH CELL ENHANCE TECH: CPT | Mod: 26

## 2021-09-08 PROCEDURE — 43264 ERCP REMOVE DUCT CALCULI: CPT | Mod: XU

## 2021-09-08 PROCEDURE — 74328 X-RAY BILE DUCT ENDOSCOPY: CPT | Mod: 26

## 2021-09-08 PROCEDURE — 43262 ENDO CHOLANGIOPANCREATOGRAPH: CPT | Mod: XU

## 2021-09-08 PROCEDURE — 43274 ERCP DUCT STENT PLACEMENT: CPT

## 2021-09-08 RX ORDER — LEVOTHYROXINE SODIUM 125 MCG
125 TABLET ORAL AT BEDTIME
Refills: 0 | Status: DISCONTINUED | OUTPATIENT
Start: 2021-09-08 | End: 2021-09-09

## 2021-09-08 RX ORDER — INDOMETHACIN 50 MG
100 CAPSULE ORAL ONCE
Refills: 0 | Status: COMPLETED | OUTPATIENT
Start: 2021-09-08 | End: 2021-09-08

## 2021-09-08 RX ORDER — POTASSIUM CHLORIDE 20 MEQ
20 PACKET (EA) ORAL ONCE
Refills: 0 | Status: COMPLETED | OUTPATIENT
Start: 2021-09-08 | End: 2021-09-08

## 2021-09-08 RX ADMIN — Medication 81 MILLIGRAM(S): at 17:26

## 2021-09-08 RX ADMIN — Medication 125 MICROGRAM(S): at 23:08

## 2021-09-08 RX ADMIN — CHLORHEXIDINE GLUCONATE 1 APPLICATION(S): 213 SOLUTION TOPICAL at 05:55

## 2021-09-08 RX ADMIN — Medication 100 MILLIGRAM(S): at 14:30

## 2021-09-08 RX ADMIN — Medication 3 UNIT(S): at 17:26

## 2021-09-08 RX ADMIN — LISINOPRIL 20 MILLIGRAM(S): 2.5 TABLET ORAL at 05:56

## 2021-09-08 RX ADMIN — Medication 50 MILLIEQUIVALENT(S): at 05:53

## 2021-09-08 RX ADMIN — PANTOPRAZOLE SODIUM 40 MILLIGRAM(S): 20 TABLET, DELAYED RELEASE ORAL at 06:23

## 2021-09-08 NOTE — PROGRESS NOTE ADULT - SUBJECTIVE AND OBJECTIVE BOX
LEANDERRUBIN  71y, Female  Allergy: No Known Allergies    Hospital Day: 4d    Patient seen and examined earlier today. Plan for ERCP today, patient feeling well, no complaints.      PMH/PSH:  PAST MEDICAL & SURGICAL HISTORY:      LAST 24-Hr EVENTS:    VITALS:  T(F): 98.1 (09-08-21 @ 15:27), Max: 98.5 (09-08-21 @ 04:23)  HR: 51 (09-08-21 @ 15:27)  BP: 156/64 (09-08-21 @ 15:27) (124/85 - 179/75)  RR: 18 (09-08-21 @ 15:27)  SpO2: 94% (09-08-21 @ 15:02)        TESTS & MEASUREMENTS:  Weight (Kg): 89.8 (09-08-21 @ 12:36)  BMI (kg/m2): 35.1 (09-08)                          14.8   12.23 )-----------( 297      ( 07 Sep 2021 21:36 )             44.8     PT/INR - ( 07 Sep 2021 12:01 )   PT: 11.80 sec;   INR: 1.03 ratio           09-07    136  |  98  |  18  ----------------------------<  55<L>  3.4<L>   |  25  |  0.6<L>    Ca    9.4      07 Sep 2021 21:36  Mg     2.0     09-07    TPro  6.6  /  Alb  3.9  /  TBili  5.4<H>  /  DBili  x   /  AST  371<H>  /  ALT  675<H>  /  AlkPhos  417<H>  09-07    LIVER FUNCTIONS - ( 07 Sep 2021 05:40 )  Alb: 3.9 g/dL / Pro: 6.6 g/dL / ALK PHOS: 417 U/L / ALT: 675 U/L / AST: 371 U/L / GGT: x                 Culture - Urine (collected 09-03-21 @ 20:28)  Source: Clean Catch Clean Catch (Midstream)  Final Report (09-05-21 @ 05:57):    <10,000 CFU/mL Normal Urogenital Marley            Ferritin, Serum: 1370 ng/mL (09-05-21 @ 11:00)      COVID-19 PCR: NotDetec (09-03-21 @ 18:02)      RADIOLOGY, ECG, & ADDITIONAL TESTS:      RECENT DIAGNOSTIC ORDERS:  Complete Blood Count + Automated Diff: AM Sched. Collection: 09-Sep-2021 04:30 (09-08-21 @ 15:02)  Comprehensive Metabolic Panel: AM Sched. Collection: 09-Sep-2021 04:30 (09-08-21 @ 15:02)  Magnesium, Serum: AM Sched. Collection: 09-Sep-2021 04:30 (09-08-21 @ 15:02)  Prothrombin Time and INR, Plasma:  Start Date:09-Sep-2021. AM Sched. Collection:09-Sep-2021 04:30 (09-08-21 @ 15:02)  Prothrombin Time and INR, Plasma: Repeat From: 08-Sep-2021 15:01 To: 15-Sep-2021 04:30, Every 1 day(s) Start Date:08-Sep-2021 (09-08-21 @ 15:02)  Magnesium, Serum: Repeat From: 08-Sep-2021 15:01 To: 15-Sep-2021 04:30, Every 1 day(s) (09-08-21 @ 15:02)  Comprehensive Metabolic Panel: Repeat From: 08-Sep-2021 15:01 To: 15-Sep-2021 04:30, Every 1 day(s) (09-08-21 @ 15:02)  Complete Blood Count + Automated Diff: Repeat From: 08-Sep-2021 15:01 To: 15-Sep-2021 04:30, Every 1 day(s) (09-08-21 @ 15:02)  Xray Kidney Ureter Bladder: 14:22  Exam Completed (09-08-21 @ 14:23)  T4, Serum: 16:00 (09-08-21 @ 12:23)  Triiodothyronine, Total (T3 Total): 16:00 (09-08-21 @ 12:23)      MEDICATIONS:  MEDICATIONS  (STANDING):  aspirin  chewable 81 milliGRAM(s) Oral daily  chlorhexidine 4% Liquid 1 Application(s) Topical <User Schedule>  dextrose 40% Gel 15 Gram(s) Oral once  dextrose 5%. 1000 milliLiter(s) (50 mL/Hr) IV Continuous <Continuous>  dextrose 5%. 1000 milliLiter(s) (100 mL/Hr) IV Continuous <Continuous>  dextrose 50% Injectable 25 Gram(s) IV Push once  dextrose 50% Injectable 12.5 Gram(s) IV Push once  dextrose 50% Injectable 25 Gram(s) IV Push once  glucagon  Injectable 1 milliGRAM(s) IntraMuscular once  insulin glargine Injectable (LANTUS) 9 Unit(s) SubCutaneous every morning  insulin lispro (ADMELOG) corrective regimen sliding scale   SubCutaneous three times a day before meals  insulin lispro Injectable (ADMELOG) 3 Unit(s) SubCutaneous three times a day before meals  levothyroxine 125 MICROGram(s) Oral at bedtime  lisinopril 20 milliGRAM(s) Oral daily  pantoprazole    Tablet 40 milliGRAM(s) Oral before breakfast    MEDICATIONS  (PRN):  traMADol 25 milliGRAM(s) Oral four times a day PRN Mild Pain (1 - 3)      HOME MEDICATIONS:      PHYSICAL EXAM:  GENERAL: elderly F, NAD, non toxic appearing  EYES: icteric sclera - improving   ENT: hearing grossly intact, oropharynx clear, MMM  PSYCH: no agitation, baseline mentation  NERVOUS SYSTEM:  Alert & Oriented X3, CN 2-12 grossly intact  PULMONARY: Clear to auscultation bilaterally; No rales, rhonchi, wheezing, or rubs  CARDIOVASCULAR: Regular rate and rhythm; No murmurs, rubs, or gallops, no LE edema  GI: Soft, Nontender, Nondistended; Bowel sounds present  EXTREMITIES:  2+ Peripheral Pulses, No clubbing, cyanosis  LYMPH: No lymphadenopathy noted  SKIN: No rashes or lesions, jaundice improved

## 2021-09-08 NOTE — PROGRESS NOTE ADULT - ASSESSMENT
70 y/o f w/ pmhx of hypothyroidism not complaint with her synthroid, has not follow up with her physician in year, GERD presented with epigastric pain and acid taste in mouth for  associated with nausea and vomiting, nbnb, diaphoresis, in the setting of hyperglycemia and elevated ketone bodies all suggestive of a presentation of DKA diagnosis secondary to unknown DM was made in ER and patient received an insulin IV bolus. She was started on insulin gtt and referred to ICU for admission.       #DKA - resolved  - BG on admission ~500, AG 21, LA 2.2, B-hydroxybutrate 1.0  - no formal dx of DM in past but has not been to PCP reportedly >10 years  - A1c is 8.7  - s/p insulin gtt, gap closed and BG well controlled  - c/w lantus 9u qAM, humalog 3u AC, SSI  - Endocrinology following   - Carb Consistent diet  - will need outpatient PMD, ophth, podiatry and endocrine referral set up  - Diabetes education and insulin education on discharge   - starting on lisinopril 10mg daily  - starting on ASA 81mg daily  - holding statin for now due to transaminitis     # Accelerated HTN-improved   - starting lisinopril 10mg, titrate as needed     # Transaminitis 2/2 Choledocholithiasis   - , , Bili 8.2  - US with diffuse biliary ductal dilatation   - MRCP w/ distal CBD stone   - ERCP today w/ removal of stone and placement of stent     #Leukocytosis- resolved    #GERD- C/w PPI    #Hypothyroidism- TSH 17.95, start synthroid 125mcg today, repeat TSH in 4-6 weeks. F/u T4 level    #Progress Note Handoff  Pending (specify):  advance diet, anticipate dc tomorrow   Family discussion: patient updated  Disposition: Unknown at this time_____x___        Rosina Rand,

## 2021-09-08 NOTE — PROGRESS NOTE ADULT - ASSESSMENT
70 y/o f w/ pmhx of hypothyroidism, GERD c/o epigastric pain, dyspepsia, and jaundice with n/v/ diaphoresis, in the setting of hyperglycemia and elevated ketone bodies all suggestive of a presentation of DKA diagnosis secondary to unknown DM. Pt also found to have transaminitis with hyperbilirubinemia and jaundice on exam:    # Epigastric pain   # NBNB emesis  # Hyperglycemia, HAGMA, and Ketonuria consistent with DKA for newly diagnosed Diabetes RESOLVED  - BG on admission ~500, AG 21, LA 2.2, B-hydroxybutrate 1.0,  A1c is 8.7  - s/p insulin gtt in unit and downgraded to floor  -Fingerstick controlled 120s-150s  - c/w lantus 9u qAM, humalog 3u AC, SSI  - C/w Carb Consistent diet  - will need outpatient PMD, ophth, podiatry and endocrine referral set up  - Diabetes educator c/s placed  - Recommend atorvastatin 40mg daily  -POCT  - starting on ASA 81mg daily    # Transaminitis   #Obstructive Jaundice r/o gallstone pancreatitis vs choledocholithiasis vs pancreatic malignancy vs hepatitis  - AST: 335->417-->343-->371  - ALT: 414->536-->587-->675  - Tbili: 2.8-->3.1-->3.9-->8.2-->5.4  -RUQ U/S shows hepatic steatosis with diffuse biliary duct dilation, CBD 1cm  - F/u acute hep panel, GULSHAN, anti-mitochondria, anti-smooth, CMV, EBV, LDH, Iron profile + ferritin, ceruloplasmin, alpha-1 antitrypsin> Labs negative for Hep B, C, A, Hemochromatosis, and Yair disease. F/u on anti mitochondria, alpha 1 antitrypsin  -F/u PT/PTT-wnl  -MRCP- Distal CBD dilation of 1.1cm with 7mm obstructive stone, plan for ERCP with advanced GI today 9/8  -Holding atorvastatin 40mg    # Accelerated HTN- BP elevated 130s-150s   - C/w lisinopril 20mg q24h  - starting low and slow as patient has never been on anti hypertensive therapy. will titrate up lisinopril as needed    #Hypothyroidism:  - TSH elevated 17.94, f/u T4    #Hypokalemia  -K 3.4, repleted in the morning  #GERD  -C/w PPI      -    DVT PPX  GI PPX  CHG  FULL CODE    #Progress Note Handoff  Pending (specify):  DM educator, ERCP  Family discussion: patient updated. in agreement of plan. all questions answered. Updated daughter Anna(PICU nurse in [place]), [phone] and Angélica [phone]  Disposition: Unknown at this time________   70 y/o f w/ pmhx of hypothyroidism, GERD c/o epigastric pain, dyspepsia, and jaundice with n/v/ diaphoresis, in the setting of hyperglycemia and elevated ketone bodies all suggestive of a presentation of DKA diagnosis secondary to unknown DM. Pt also found to have transaminitis with hyperbilirubinemia and jaundice on exam:    # Epigastric pain   # NBNB emesis  # Hyperglycemia, HAGMA, and Ketonuria consistent with DKA for newly diagnosed Diabetes RESOLVED  - BG on admission ~500, AG 21, LA 2.2, B-hydroxybutrate 1.0,  A1c is 8.7  - s/p insulin gtt in unit and downgraded to floor  -Fingerstick controlled 80s-150s  - c/w lantus 9u qAM, humalog 3u AC, SSI  - C/w Carb Consistent diet  - will need outpatient PMD, ophth, podiatry and endocrine referral set up  - Diabetes educator c/s placed  - Recommend atorvastatin 40mg daily  -POCT  - starting on ASA 81mg daily    # Transaminitis   #Obstructive Jaundice r/o gallstone pancreatitis vs choledocholithiasis vs pancreatic malignancy vs hepatitis  - AST: 335->417-->343-->371  - ALT: 414->536-->587-->675  - Tbili: 2.8-->3.1-->3.9-->8.2-->5.4  -RUQ U/S shows hepatic steatosis with diffuse biliary duct dilation, CBD 1cm  - F/u acute hep panel, GULSHAN, anti-mitochondria, anti-smooth, CMV, EBV, LDH, Iron profile + ferritin, ceruloplasmin, alpha-1 antitrypsin> Labs negative for Hep B, C, A, Hemochromatosis, and Yair disease. F/u on anti mitochondria, alpha 1 antitrypsin  -F/u PT/PTT-wnl  -MRCP- Distal CBD dilation of 1.1cm with 7mm obstructive stone, plan for ERCP with advanced GI today 9/8  -Holding atorvastatin 40mg    # Accelerated HTN- BP elevated 130s-150s   - C/w lisinopril 20mg q24h  - starting low and slow as patient has never been on anti hypertensive therapy. will titrate up lisinopril as needed    #Hypothyroidism:  - TSH elevated 17.94, f/u T4    #Hypokalemia  -K 3.4, repleted in the morning  #GERD  -C/w PPI      -    DVT PPX  GI PPX  CHG  FULL CODE    #Progress Note Handoff  Pending (specify):  DM educator, ERCP  Family discussion: patient updated. in agreement of plan. all questions answered. Updated daughter Anna(PICU nurse in [place]), [phone] and Angélica [phone]  Disposition: Unknown at this time________

## 2021-09-08 NOTE — CHART NOTE - NSCHARTNOTEFT_GEN_A_CORE
ENDO RECOVERY ANESTHESIA ADMISSION NOTE      Procedure:   Post op diagnosis:      ____  Intubated  TV:______       Rate: ______      FiO2: ______    __x__  Patent Airway    __x__  Full return of protective reflexes    __x__  Full recovery from anesthesia / back to baseline status    Vitals  HR: 62  BP: 124/85  RR: 18  O2 Sat: 98  Temp: 97.6    Mental Status:  __x__ Awake   ___x__ Alert   _____ Drowsy   _____ Sedated    Nausea/Vomiting:  __x__ NO  ______Yes,   See Post - Op Orders          Pain Scale (0-10):  _____    Treatment: ____ None    __x__ See Post - Op/PCA Orders    Post - Operative Fluids:   ____ Oral   __x__ See Post - Op Orders    Plan: Discharge when criteria met:   ____Home       __x___Floor     _____Critical Care   Other:_________________    Comments: Patient had smooth intraoperative event, no anesthesia complication.

## 2021-09-08 NOTE — PRE-OP CHECKLIST - SELECT TESTS ORDERED
BMP/CBC/CMP/Hepatic Function/Urinalysis/EKG/CXR/POCT Blood Glucose/COVID-19
none
fs 83/POCT Blood Glucose

## 2021-09-08 NOTE — PROGRESS NOTE ADULT - SUBJECTIVE AND OBJECTIVE BOX
RUBIN TABOR  71y  Female      Patient is a 71y old  Female who presents with a chief complaint of DKA (07 Sep 2021 18:23)      INTERVAL HPI/OVERNIGHT EVENTS: Pt was hypokalemic overnight to 3.4 and K+ was repleted.  This morning, she denies any abdominal or back pain, n/v/d, fever, chills, chest pain, shortness of breath, palpitations. She's scheduled for an ERCP today with advanced GI.      REVIEW OF SYSTEMS:  CONSTITUTIONAL: No fever, weight loss, or fatigue  EYES: No eye pain, visual disturbances, or discharge  ENMT:  No difficulty hearing, tinnitus, vertigo; No sinus or throat pain  NECK: No pain or stiffness  BREASTS: No pain, masses, or nipple discharge  RESPIRATORY: No cough, wheezing, chills or hemoptysis; No shortness of breath  CARDIOVASCULAR: No chest pain, palpitations, dizziness, or leg swelling  GASTROINTESTINAL: No abdominal or epigastric pain. No nausea, vomiting, or hematemesis; No diarrhea or constipation. No melena or hematochezia.  GENITOURINARY: No dysuria, frequency, hematuria, or incontinence  NEUROLOGICAL: No headaches, memory loss, loss of strength, numbness, or tremors  SKIN: No itching, burning, rashes, or lesions   LYMPH NODES: No enlarged glands  ENDOCRINE: No heat or cold intolerance; No hair loss  MUSCULOSKELETAL: No joint pain or swelling; No muscle, back, or extremity pain  PSYCHIATRIC: No depression, anxiety, mood swings, or difficulty sleeping  HEME/LYMPH: No easy bruising, or bleeding gums  ALLERY AND IMMUNOLOGIC: No hives or eczema  FAMILY HISTORY:    T(C): 35.9 (09-08-21 @ 06:01), Max: 36.9 (09-07-21 @ 14:27)  HR: 60 (09-08-21 @ 06:19) (54 - 64)  BP: 148/70 (09-08-21 @ 06:19) (148/70 - 159/65)  RR: 20 (09-08-21 @ 06:01) (18 - 20)  SpO2: 98% (09-07-21 @ 22:01) (98% - 98%)  Wt(kg): --Vital Signs Last 24 Hrs  T(C): 35.9 (08 Sep 2021 06:01), Max: 36.9 (07 Sep 2021 14:27)  T(F): 96.6 (08 Sep 2021 06:01), Max: 98.4 (07 Sep 2021 14:27)  HR: 60 (08 Sep 2021 06:19) (54 - 64)  BP: 148/70 (08 Sep 2021 06:19) (148/70 - 159/65)  BP(mean): --  RR: 20 (08 Sep 2021 06:01) (18 - 20)  SpO2: 98% (07 Sep 2021 22:01) (98% - 98%)  No Known Allergies      PHYSICAL EXAM:  GENERAL: NAD, well-groomed, well-developed  HEAD:  Atraumatic, Normocephalic  EYES: EOMI, PERRLA, conjunctiva and sclera yellow  ENMT: No tonsillar erythema, exudates, or enlargement; Moist mucous membranes, Good dentition, No lesions  NECK: Supple, No JVD, Normal thyroid  NERVOUS SYSTEM:  Alert & Oriented X3, Good concentration; Motor Strength 5/5 B/L upper and lower extremities; DTRs 2+ intact and symmetric  CHEST/LUNG: Clear to percussion bilaterally; No rales, rhonchi, wheezing, or rubs  HEART: Regular rate and rhythm; No murmurs, rubs, or gallops  ABDOMEN: Soft, Nontender, Nondistended; Bowel sounds present  EXTREMITIES:  2+ Peripheral Pulses, No clubbing, cyanosis, or edema  LYMPH: No lymphadenopathy noted  SKIN: No rashes or lesions    Consultant(s) Notes Reviewed:  [x ] YES  [ ] NO  Care Discussed with Consultants/Other Providers [ x] YES  [ ] NO    LABS:                     14.8   12.23 )-----------( 297      ( 07 Sep 2021 21:36 )             44.8     09-07    136  |  98  |  18  ----------------------------<  55<L>  3.4<L>   |  25  |  0.6<L>    Ca    9.4      07 Sep 2021 21:36  Mg     2.0     09-07    TPro  6.6  /  Alb  3.9  /  TBili  5.4<H>  /  DBili  x   /  AST  371<H>  /  ALT  675<H>  /  AlkPhos  417<H>  09-07    Acute Hepatitis Panel (09.05.21 @ 11:00)   Hepatitis C Virus Interpretation: Nonreact: Hepatitis C AB   S/CO Ratio Interpretation   < 1.00 Non-Reactive   1.00 - 4.99 Weakly-Reactive   >= 5.00 Reactive   Non-Reactive: A person witha non-reactive HCV antibody result is   considered uninfected. No further action is needed unless recent   infection is suspected. In these cases, consider repeat testing later to   detect seroconversion..   Weakly-Reactive: HCV antibody test is abnormal, HCV RNA Qualitative test   will follow.   Reactive: HCV antibody test is abnormal, HCV RNA Qualitative test will   follow.   Note: HCV antibody testing is performed on the [employer]  system.   Hepatitis C Virus S/CO Ratio: 0.14 S/CO   Hepatitis B Core IgM Antibody: Nonreact   Hepatitis B Surface Antigen: Nonreact   Hepatitis A IgM Antibody: Nonreact Prothrombin Time and INR, Plasma in AM (09.07.21 @ 12:01)   Prothrombin Time, Plasma: 11.80 sec   INR: 1.03: Recommended ranges for therapeutic INR:     Thyroid Stimulating Hormone, Serum (09.07.21 @ 15:55)   Thyroid Stimulating Hormone, Serum: 17.95 uIU/mL     RADIOLOGY & ADDITIONAL TESTS:    < from: MR Abdomen w/wo IV Cont (09.06.21 @ 18:58) >  HEPATOBILIARY: Status post cholecystectomy. CBD dilated to 1.1 cm. Distal CBD stone measuring up to 7 mm (9/58).    < end of copied text >  < from: MR Abdomen w/wo IV Cont (09.06.21 @ 18:58) >  IMPRESSION:    Status post cholecystectomy. Distal CBD stone identified. CBD dilated to 1.1 cm.      Imaging Personally Reviewed:  [ ] YES  [ ] NO  aspirin  chewable 81 milliGRAM(s) Oral daily  chlorhexidine 4% Liquid 1 Application(s) Topical <User Schedule>  dextrose 40% Gel 15 Gram(s) Oral once  dextrose 5%. 1000 milliLiter(s) IV Continuous <Continuous>  dextrose 5%. 1000 milliLiter(s) IV Continuous <Continuous>  dextrose 50% Injectable 25 Gram(s) IV Push once  dextrose 50% Injectable 12.5 Gram(s) IV Push once  dextrose 50% Injectable 25 Gram(s) IV Push once  glucagon  Injectable 1 milliGRAM(s) IntraMuscular once  insulin glargine Injectable (LANTUS) 9 Unit(s) SubCutaneous every morning  insulin lispro (ADMELOG) corrective regimen sliding scale   SubCutaneous three times a day before meals  insulin lispro Injectable (ADMELOG) 3 Unit(s) SubCutaneous three times a day before meals  lisinopril 20 milliGRAM(s) Oral daily  pantoprazole    Tablet 40 milliGRAM(s) Oral before breakfast  traMADol 25 milliGRAM(s) Oral four times a day PRN      HEALTH ISSUES - PROBLEM Dx:

## 2021-09-09 ENCOUNTER — TRANSCRIPTION ENCOUNTER (OUTPATIENT)
Age: 71
End: 2021-09-09

## 2021-09-09 VITALS
SYSTOLIC BLOOD PRESSURE: 137 MMHG | DIASTOLIC BLOOD PRESSURE: 60 MMHG | HEART RATE: 57 BPM | TEMPERATURE: 98 F | RESPIRATION RATE: 18 BRPM

## 2021-09-09 LAB
ALBUMIN SERPL ELPH-MCNC: 3.9 G/DL — SIGNIFICANT CHANGE UP (ref 3.5–5.2)
ALP SERPL-CCNC: 377 U/L — HIGH (ref 30–115)
ALT FLD-CCNC: 630 U/L — HIGH (ref 0–41)
ANION GAP SERPL CALC-SCNC: 12 MMOL/L — SIGNIFICANT CHANGE UP (ref 7–14)
AST SERPL-CCNC: 299 U/L — HIGH (ref 0–41)
BASOPHILS # BLD AUTO: 0.03 K/UL — SIGNIFICANT CHANGE UP (ref 0–0.2)
BASOPHILS NFR BLD AUTO: 0.3 % — SIGNIFICANT CHANGE UP (ref 0–1)
BILIRUB SERPL-MCNC: 2.9 MG/DL — HIGH (ref 0.2–1.2)
BUN SERPL-MCNC: 14 MG/DL — SIGNIFICANT CHANGE UP (ref 10–20)
CALCIUM SERPL-MCNC: 9 MG/DL — SIGNIFICANT CHANGE UP (ref 8.5–10.1)
CHLORIDE SERPL-SCNC: 98 MMOL/L — SIGNIFICANT CHANGE UP (ref 98–110)
CO2 SERPL-SCNC: 28 MMOL/L — SIGNIFICANT CHANGE UP (ref 17–32)
CREAT SERPL-MCNC: 0.7 MG/DL — SIGNIFICANT CHANGE UP (ref 0.7–1.5)
DRUG SCREEN, SERUM: SIGNIFICANT CHANGE UP
EOSINOPHIL # BLD AUTO: 0.03 K/UL — SIGNIFICANT CHANGE UP (ref 0–0.7)
EOSINOPHIL NFR BLD AUTO: 0.3 % — SIGNIFICANT CHANGE UP (ref 0–8)
GLUCOSE BLDC GLUCOMTR-MCNC: 117 MG/DL — HIGH (ref 70–99)
GLUCOSE BLDC GLUCOMTR-MCNC: 129 MG/DL — HIGH (ref 70–99)
GLUCOSE BLDC GLUCOMTR-MCNC: 142 MG/DL — HIGH (ref 70–99)
GLUCOSE SERPL-MCNC: 90 MG/DL — SIGNIFICANT CHANGE UP (ref 70–99)
HCT VFR BLD CALC: 44.9 % — SIGNIFICANT CHANGE UP (ref 37–47)
HGB BLD-MCNC: 14.8 G/DL — SIGNIFICANT CHANGE UP (ref 12–16)
IMM GRANULOCYTES NFR BLD AUTO: 0.3 % — SIGNIFICANT CHANGE UP (ref 0.1–0.3)
INR BLD: 1.07 RATIO — SIGNIFICANT CHANGE UP (ref 0.65–1.3)
LYMPHOCYTES # BLD AUTO: 3.01 K/UL — SIGNIFICANT CHANGE UP (ref 1.2–3.4)
LYMPHOCYTES # BLD AUTO: 32.6 % — SIGNIFICANT CHANGE UP (ref 20.5–51.1)
MAGNESIUM SERPL-MCNC: 2.1 MG/DL — SIGNIFICANT CHANGE UP (ref 1.8–2.4)
MCHC RBC-ENTMCNC: 28.4 PG — SIGNIFICANT CHANGE UP (ref 27–31)
MCHC RBC-ENTMCNC: 33 G/DL — SIGNIFICANT CHANGE UP (ref 32–37)
MCV RBC AUTO: 86.2 FL — SIGNIFICANT CHANGE UP (ref 81–99)
MONOCYTES # BLD AUTO: 0.65 K/UL — HIGH (ref 0.1–0.6)
MONOCYTES NFR BLD AUTO: 7 % — SIGNIFICANT CHANGE UP (ref 1.7–9.3)
NEUTROPHILS # BLD AUTO: 5.47 K/UL — SIGNIFICANT CHANGE UP (ref 1.4–6.5)
NEUTROPHILS NFR BLD AUTO: 59.5 % — SIGNIFICANT CHANGE UP (ref 42.2–75.2)
NRBC # BLD: 0 /100 WBCS — SIGNIFICANT CHANGE UP (ref 0–0)
PLATELET # BLD AUTO: 310 K/UL — SIGNIFICANT CHANGE UP (ref 130–400)
POTASSIUM SERPL-MCNC: 4.7 MMOL/L — SIGNIFICANT CHANGE UP (ref 3.5–5)
POTASSIUM SERPL-SCNC: 4.7 MMOL/L — SIGNIFICANT CHANGE UP (ref 3.5–5)
PROT SERPL-MCNC: 6.6 G/DL — SIGNIFICANT CHANGE UP (ref 6–8)
PROTHROM AB SERPL-ACNC: 12.3 SEC — SIGNIFICANT CHANGE UP (ref 9.95–12.87)
RBC # BLD: 5.21 M/UL — SIGNIFICANT CHANGE UP (ref 4.2–5.4)
RBC # FLD: 14.5 % — SIGNIFICANT CHANGE UP (ref 11.5–14.5)
SODIUM SERPL-SCNC: 138 MMOL/L — SIGNIFICANT CHANGE UP (ref 135–146)
SOLUBLE LIVER IGG SER IA-ACNC: 1 — SIGNIFICANT CHANGE UP (ref 0–20)
WBC # BLD: 9.22 K/UL — SIGNIFICANT CHANGE UP (ref 4.8–10.8)
WBC # FLD AUTO: 9.22 K/UL — SIGNIFICANT CHANGE UP (ref 4.8–10.8)

## 2021-09-09 PROCEDURE — 99232 SBSQ HOSP IP/OBS MODERATE 35: CPT

## 2021-09-09 PROCEDURE — 99239 HOSP IP/OBS DSCHRG MGMT >30: CPT

## 2021-09-09 RX ORDER — LEVOTHYROXINE SODIUM 125 MCG
1 TABLET ORAL
Qty: 30 | Refills: 0
Start: 2021-09-09 | End: 2021-10-08

## 2021-09-09 RX ORDER — LISINOPRIL 2.5 MG/1
1 TABLET ORAL
Qty: 30 | Refills: 0
Start: 2021-09-09 | End: 2021-10-08

## 2021-09-09 RX ORDER — ISOPROPYL ALCOHOL, BENZOCAINE .7; .06 ML/ML; ML/ML
1 SWAB TOPICAL
Qty: 100 | Refills: 3
Start: 2021-09-09 | End: 2021-12-17

## 2021-09-09 RX ORDER — ASPIRIN/CALCIUM CARB/MAGNESIUM 324 MG
1 TABLET ORAL
Qty: 0 | Refills: 0 | DISCHARGE
Start: 2021-09-09

## 2021-09-09 RX ADMIN — Medication 3 UNIT(S): at 08:05

## 2021-09-09 RX ADMIN — Medication 3 UNIT(S): at 12:18

## 2021-09-09 RX ADMIN — INSULIN GLARGINE 9 UNIT(S): 100 INJECTION, SOLUTION SUBCUTANEOUS at 08:05

## 2021-09-09 RX ADMIN — LISINOPRIL 20 MILLIGRAM(S): 2.5 TABLET ORAL at 06:33

## 2021-09-09 RX ADMIN — PANTOPRAZOLE SODIUM 40 MILLIGRAM(S): 20 TABLET, DELAYED RELEASE ORAL at 06:33

## 2021-09-09 RX ADMIN — Medication 3 UNIT(S): at 16:49

## 2021-09-09 RX ADMIN — CHLORHEXIDINE GLUCONATE 1 APPLICATION(S): 213 SOLUTION TOPICAL at 06:32

## 2021-09-09 RX ADMIN — Medication 81 MILLIGRAM(S): at 12:18

## 2021-09-09 NOTE — PROGRESS NOTE ADULT - SUBJECTIVE AND OBJECTIVE BOX
RUBIN TABOR  71y  Female      Patient is a 71y old  Female who presents with a chief complaint of DKA (09 Sep 2021 08:52)      INTERVAL HPI/OVERNIGHT EVENTS: No acute overnight events. Pod#1 s/p ERCP. Pt tolerating her CLD well and denies any epigastric pain, back pain, nausea, vomiting, hematemesis, chest pain, shortness of breath, palpitations.    REVIEW OF SYSTEMS:  CONSTITUTIONAL: No fever, weight loss, or fatigue  EYES: No eye pain, visual disturbances, or discharge  ENMT:  No difficulty hearing, tinnitus, vertigo; No sinus or throat pain  NECK: No pain or stiffness  BREASTS: No pain, masses, or nipple discharge  RESPIRATORY: No cough, wheezing, chills or hemoptysis; No shortness of breath  CARDIOVASCULAR: No chest pain, palpitations, dizziness, or leg swelling  GASTROINTESTINAL: No abdominal or epigastric pain. No nausea, vomiting, or hematemesis; No diarrhea or constipation. No melena or hematochezia.  GENITOURINARY: No dysuria, frequency, hematuria, or incontinence  NEUROLOGICAL: No headaches, memory loss, loss of strength, numbness, or tremors  SKIN: No itching, burning, rashes, or lesions   LYMPH NODES: No enlarged glands  ENDOCRINE: No heat or cold intolerance; No hair loss  MUSCULOSKELETAL: No joint pain or swelling; No muscle, back, or extremity pain  PSYCHIATRIC: No depression, anxiety, mood swings, or difficulty sleeping  HEME/LYMPH: No easy bruising, or bleeding gums  ALLERY AND IMMUNOLOGIC: No hives or eczema  FAMILY HISTORY:    T(C): 36.3 (09-09-21 @ 06:34), Max: 36.9 (09-08-21 @ 21:06)  HR: 81 (09-09-21 @ 06:34) (49 - 81)  BP: 131/62 (09-09-21 @ 06:34) (124/85 - 168/76)  RR: 18 (09-09-21 @ 06:34) (14 - 18)  SpO2: 94% (09-08-21 @ 15:02) (94% - 96%)  Wt(kg): --Vital Signs Last 24 Hrs  T(C): 36.3 (09 Sep 2021 06:34), Max: 36.9 (08 Sep 2021 21:06)  T(F): 97.3 (09 Sep 2021 06:34), Max: 98.5 (08 Sep 2021 21:06)  HR: 81 (09 Sep 2021 06:34) (49 - 81)  BP: 131/62 (09 Sep 2021 06:34) (124/85 - 168/76)  BP(mean): --  RR: 18 (09 Sep 2021 06:34) (14 - 18)  SpO2: 94% (08 Sep 2021 15:02) (94% - 96%)  No Known Allergies      PHYSICAL EXAM:  GENERAL: NAD, well-groomed, well-developed  HEAD:  Atraumatic, Normocephalic  EYES: EOMI, PERRLA, conjunctiva and sclera clear  ENMT: No tonsillar erythema, exudates, or enlargement; Moist mucous membranes, Good dentition, No lesions  NECK: Supple, No JVD, Normal thyroid  NERVOUS SYSTEM:  Alert & Oriented X3, Good concentration; Motor Strength 5/5 B/L upper and lower extremities; DTRs 2+ intact and symmetric  CHEST/LUNG: Clear to percussion bilaterally; No rales, rhonchi, wheezing, or rubs  HEART: Regular rate and rhythm; No murmurs, rubs, or gallops  ABDOMEN: Soft, Nontender, Nondistended; Bowel sounds present  EXTREMITIES:  2+ Peripheral Pulses, No clubbing, cyanosis, or edema  LYMPH: No lymphadenopathy noted  SKIN: No rashes or lesions    Consultant(s) Notes Reviewed:  [x ] YES  [ ] NO  Care Discussed with Consultants/Other Providers [ x] YES  [ ] NO    LABS:                     14.8   9.22  )-----------( 310      ( 09 Sep 2021 06:34 )             44.9     09-09    138  |  98  |  14  ----------------------------<  90  4.7   |  28  |  0.7    Ca    9.0      09 Sep 2021 06:34  Mg     2.1     09-09    TPro  6.6  /  Alb  3.9  /  TBili  2.9<H>  /  DBili  x   /  AST  299<H>  /  ALT  630<H>  /  AlkPhos  377<H>  09-09  Microsomal Antibody Assay, Liver Kidney: <20.1: Negative 0.0 - 20.0 Alpha-1-Antitrypsin, Serum (09.05.21 @ 11:00)   Alpha-1-Antitrypsin, Serum: 220 mg/dL Anti-Nuclear Antibody (09.05.21 @ 11:00)   Anti Nuclear Factor Titer: Negative    RADIOLOGY & ADDITIONAL TESTS:        Imaging Personally Reviewed:  [ ] YES  [ ] NO  aspirin  chewable 81 milliGRAM(s) Oral daily  chlorhexidine 4% Liquid 1 Application(s) Topical <User Schedule>  dextrose 40% Gel 15 Gram(s) Oral once  dextrose 5%. 1000 milliLiter(s) IV Continuous <Continuous>  dextrose 5%. 1000 milliLiter(s) IV Continuous <Continuous>  dextrose 50% Injectable 25 Gram(s) IV Push once  dextrose 50% Injectable 12.5 Gram(s) IV Push once  dextrose 50% Injectable 25 Gram(s) IV Push once  glucagon  Injectable 1 milliGRAM(s) IntraMuscular once  insulin glargine Injectable (LANTUS) 9 Unit(s) SubCutaneous every morning  insulin lispro (ADMELOG) corrective regimen sliding scale   SubCutaneous three times a day before meals  insulin lispro Injectable (ADMELOG) 3 Unit(s) SubCutaneous three times a day before meals  levothyroxine 125 MICROGram(s) Oral at bedtime  lisinopril 20 milliGRAM(s) Oral daily  pantoprazole    Tablet 40 milliGRAM(s) Oral before breakfast  traMADol 25 milliGRAM(s) Oral four times a day PRN      HEALTH ISSUES - PROBLEM Dx:           RUBIN TABOR  71y  Female      Patient is a 71y old  Female who presents with a chief complaint of DKA (09 Sep 2021 08:52)      INTERVAL HPI/OVERNIGHT EVENTS: No acute overnight events. Pod#1 s/p ERCP. Pt tolerating her CLD well and denies any epigastric pain, back pain, nausea, vomiting, hematemesis, chest pain, shortness of breath, palpitations.    REVIEW OF SYSTEMS:  CONSTITUTIONAL: No fever, weight loss, or fatigue  EYES: No eye pain, visual disturbances, or discharge  ENMT:  No difficulty hearing, tinnitus, vertigo; No sinus or throat pain  NECK: No pain or stiffness  BREASTS: No pain, masses, or nipple discharge  RESPIRATORY: No cough, wheezing, chills or hemoptysis; No shortness of breath  CARDIOVASCULAR: No chest pain, palpitations, dizziness, or leg swelling  GASTROINTESTINAL: No abdominal or epigastric pain. No nausea, vomiting, or hematemesis; No diarrhea or constipation. No melena or hematochezia.  GENITOURINARY: No dysuria, frequency, hematuria, or incontinence  NEUROLOGICAL: No headaches, memory loss, loss of strength, numbness, or tremors  SKIN: No itching, burning, rashes, or lesions   LYMPH NODES: No enlarged glands  ENDOCRINE: No heat or cold intolerance; No hair loss  MUSCULOSKELETAL: No joint pain or swelling; No muscle, back, or extremity pain  PSYCHIATRIC: No depression, anxiety, mood swings, or difficulty sleeping  HEME/LYMPH: No easy bruising, or bleeding gums  ALLERY AND IMMUNOLOGIC: No hives or eczema  FAMILY HISTORY:    T(C): 36.3 (09-09-21 @ 06:34), Max: 36.9 (09-08-21 @ 21:06)  HR: 81 (09-09-21 @ 06:34) (49 - 81)  BP: 131/62 (09-09-21 @ 06:34) (124/85 - 168/76)  RR: 18 (09-09-21 @ 06:34) (14 - 18)  SpO2: 94% (09-08-21 @ 15:02) (94% - 96%)  Wt(kg): --Vital Signs Last 24 Hrs  T(C): 36.3 (09 Sep 2021 06:34), Max: 36.9 (08 Sep 2021 21:06)  T(F): 97.3 (09 Sep 2021 06:34), Max: 98.5 (08 Sep 2021 21:06)  HR: 81 (09 Sep 2021 06:34) (49 - 81)  BP: 131/62 (09 Sep 2021 06:34) (124/85 - 168/76)  BP(mean): --  RR: 18 (09 Sep 2021 06:34) (14 - 18)  SpO2: 94% (08 Sep 2021 15:02) (94% - 96%)  No Known Allergies      PHYSICAL EXAM:  GENERAL: NAD, well-groomed, well-developed  HEAD:  Atraumatic, Normocephalic  EYES: EOMI, PERRLA, conjunctiva and sclera clear  ENMT: No tonsillar erythema, exudates, or enlargement; Moist mucous membranes, Good dentition, No lesions  NECK: Supple, No JVD, Normal thyroid  NERVOUS SYSTEM:  Alert & Oriented X3, Good concentration; Motor Strength 5/5 B/L upper and lower extremities; DTRs 2+ intact and symmetric  CHEST/LUNG: Clear to percussion bilaterally; No rales, rhonchi, wheezing, or rubs  HEART: Regular rate and rhythm; No murmurs, rubs, or gallops  ABDOMEN: Soft, Nontender, Nondistended; Bowel sounds present  EXTREMITIES:  2+ Peripheral Pulses, No clubbing, cyanosis, or edema  LYMPH: No lymphadenopathy noted  SKIN: No rashes or lesions    Consultant(s) Notes Reviewed:  [x ] YES  [ ] NO  Care Discussed with Consultants/Other Providers [ x] YES  [ ] NO    LABS:                     14.8   9.22  )-----------( 310      ( 09 Sep 2021 06:34 )             44.9     09-09    138  |  98  |  14  ----------------------------<  90  4.7   |  28  |  0.7    Ca    9.0      09 Sep 2021 06:34  Mg     2.1     09-09    TPro  6.6  /  Alb  3.9  /  TBili  2.9<H>  /  DBili  x   /  AST  299<H>  /  ALT  630<H>  /  AlkPhos  377<H>  09-09  Microsomal Antibody Assay, Liver Kidney: <20.1: Negative 0.0 - 20.0 Alpha-1-Antitrypsin, Serum (09.05.21 @ 11:00)   Alpha-1-Antitrypsin, Serum: 220 mg/dL Anti-Nuclear Antibody (09.05.21 @ 11:00)   Anti Nuclear Factor Titer: Negative    T4, Serum: 7.4 ug/dL (09.08.21 @ 15:55)     RADIOLOGY & ADDITIONAL TESTS:    < from: MR Abdomen w/wo IV Cont (09.06.21 @ 18:58) >  IMPRESSION:    Status post cholecystectomy. Distal CBD stone identified. CBD dilated to 1.1 cm.    --- End of Report ---    < end of copied text >      Imaging Personally Reviewed:  [ ] YES  [ ] NO  aspirin  chewable 81 milliGRAM(s) Oral daily  chlorhexidine 4% Liquid 1 Application(s) Topical <User Schedule>  dextrose 40% Gel 15 Gram(s) Oral once  dextrose 5%. 1000 milliLiter(s) IV Continuous <Continuous>  dextrose 5%. 1000 milliLiter(s) IV Continuous <Continuous>  dextrose 50% Injectable 25 Gram(s) IV Push once  dextrose 50% Injectable 12.5 Gram(s) IV Push once  dextrose 50% Injectable 25 Gram(s) IV Push once  glucagon  Injectable 1 milliGRAM(s) IntraMuscular once  insulin glargine Injectable (LANTUS) 9 Unit(s) SubCutaneous every morning  insulin lispro (ADMELOG) corrective regimen sliding scale   SubCutaneous three times a day before meals  insulin lispro Injectable (ADMELOG) 3 Unit(s) SubCutaneous three times a day before meals  levothyroxine 125 MICROGram(s) Oral at bedtime  lisinopril 20 milliGRAM(s) Oral daily  pantoprazole    Tablet 40 milliGRAM(s) Oral before breakfast  traMADol 25 milliGRAM(s) Oral four times a day PRN      HEALTH ISSUES - PROBLEM Dx:

## 2021-09-09 NOTE — DISCHARGE NOTE PROVIDER - CARE PROVIDERS DIRECT ADDRESSES
,DirectAddress_Unknown,DirectAddress_Unknown,[email or web address]

## 2021-09-09 NOTE — PROGRESS NOTE ADULT - SUBJECTIVE AND OBJECTIVE BOX
Reason for Endocrinology Consult: Diabetes    HPI: 71y Female      Neuroglycopenia within past year?    Outpatient Endocrinologist?    PAST MEDICAL & SURGICAL HISTORY:    FAMILY HISTORY:      SH:  Smoking  Etoh:  Recreational Drugs:    Home Medications:      Current (Non-Endocrine) Meds:  aspirin  chewable 81 milliGRAM(s) Oral daily  chlorhexidine 4% Liquid 1 Application(s) Topical <User Schedule>  dextrose 5%. 1000 milliLiter(s) IV Continuous <Continuous>  dextrose 5%. 1000 milliLiter(s) IV Continuous <Continuous>  lisinopril 20 milliGRAM(s) Oral daily  pantoprazole    Tablet 40 milliGRAM(s) Oral before breakfast  traMADol 25 milliGRAM(s) Oral four times a day PRN      Current Endocrine Meds:   dextrose 40% Gel 15 Gram(s) Oral once  dextrose 50% Injectable 25 Gram(s) IV Push once  dextrose 50% Injectable 12.5 Gram(s) IV Push once  dextrose 50% Injectable 25 Gram(s) IV Push once  glucagon  Injectable 1 milliGRAM(s) IntraMuscular once  insulin glargine Injectable (LANTUS) 9 Unit(s) SubCutaneous every morning  insulin lispro (ADMELOG) corrective regimen sliding scale   SubCutaneous three times a day before meals  insulin lispro Injectable (ADMELOG) 3 Unit(s) SubCutaneous three times a day before meals  levothyroxine 125 MICROGram(s) Oral at bedtime      Allergies:  No Known Allergies      ROS:  Denies the following except as indicated.    General: weight loss/weight gain, decreased appetite, fatigue, fever  Eyes: blurry vision, double vision  ENT: neck swelling, dysphagia, voice changes   CV: palpitations, SOB, chest pain, cough  GI: nausea, vomiting, diarrhea, constipation, abdominal pain  : nocturia,  polyuria, dysuria  Endo: decreased libido, heat/cold intolerance, jitteriness  MSK: arthralgias, myalgias  Skin: rash, dryness, diaphoresis  Neuro: pedal numbness,pedal paresthesias, pedal pain    Height (cm): 160 (09-08 @ 12:36)  Weight (kg): 89.8 (09-08 @ 12:36)  BMI (kg/m2): 35.1 (09-08 @ 12:36)    Vital Signs Last 24 Hrs  T(C): 36.3 (09 Sep 2021 06:34), Max: 36.9 (08 Sep 2021 21:06)  T(F): 97.3 (09 Sep 2021 06:34), Max: 98.5 (08 Sep 2021 21:06)  HR: 81 (09 Sep 2021 06:34) (49 - 81)  BP: 131/62 (09 Sep 2021 06:34) (124/85 - 168/76)  BP(mean): --  RR: 18 (09 Sep 2021 06:34) (14 - 18)  SpO2: 94% (08 Sep 2021 15:02) (94% - 96%)  Constitutional: WN/WD in NAD.   Neck: no thyromegaly or palpable thyroid nodules   Respiratory: lungs CTAB.  Cardiovascular: regular rate and rhythm, normal S1 and S2, no audible murmurs  GI: soft, NT/ND, no masses/HSM appreciated.  Ext: no edema, no ulcers, pedal pulses palpable bilaterally  Neurology: no tremor, monofilament sensation intact in feet  Psychiatric: A&O x 3, normal affect/mood.        LABS:                        14.8   12.23 )-----------( 297      ( 07 Sep 2021 21:36 )             44.8     09-07    136  |  98  |  18  ----------------------------<  55<L>  3.4<L>   |  25  |  0.6<L>    Ca    9.4      07 Sep 2021 21:36  Mg     2.0     09-07      PT/INR - ( 09 Sep 2021 06:34 )   PT: 12.30 sec;   INR: 1.07 ratio                                    Thyroid Stimulating Hormone, Serum: 17.95 (09-07 @ 15:55)      Triiodothyronine, Total (T3 Total): 95 ng/dL (09-08-21 @ 15:55)      RADIOLOGY & ADDITIONAL STUDIES:    A/P:71yFemale    1.  DM  For now:  Glargine-    units at bedtime  Lispro-    units three times a day before meals   Will continue to monitor     At discharge, recommend:      Pt can follow up at discharge with:    Above discussed with resident.

## 2021-09-09 NOTE — DISCHARGE NOTE PROVIDER - NSDCCPCAREPLAN_GEN_ALL_CORE_FT
PRINCIPAL DISCHARGE DIAGNOSIS  Diagnosis: DKA (diabetic ketoacidosis)  Assessment and Plan of Treatment: You were found to have very elevated glucose which led to an acidotic state of the body requiring insulin drip and constant monitoring of the glucose. These episodes are triggered by either medication noncompliance, poor dietary compliance, or infection.  Your blood sugar levels were controlled by your discharge date but we recommend you follow a DASH diet and carbohydrate consistent diet, as well as exercise to keep your blood glucose controlled.   Please follow up with your primary doctor and endocrinology for further management and monitoring.        SECONDARY DISCHARGE DIAGNOSES  Diagnosis: Choledocholithiasis with obstruction  Assessment and Plan of Treatment: Choledocholithiasis refers to the presence of gallstones within the common bile duct. It has been estimated that 5 to 20 percent of patients with gallstones will have choledocholithiasis at the time of cholecystectomy, with the incidence increasing with age. Most patients with choledocholithiasis are symptomatic, although occasional patients are asymptomatic. Symptoms associated with choledocholithiasis include right upper quadrant or epigastric pain, nausea, vomiting, and jaundice.  The MRCP imaging showed you had a stone obstructing your common bile duct causing you to have elevated liver enzymes, be jaundice, and experience abdominal pain. You underwent the ERCP procedure to remove the stone and a temporary plastic stent was placed to dilate the duct. You will need to follow up with your gastroenterologist upon your discharge to schedule another ERCP for stent removal.      Diagnosis: Subclinical hypothyroidism  Assessment and Plan of Treatment: Hypothyroidism is a condition that makes you feel tired.  There is a gland in your neck called the thyroid gland. It makes thyroid hormone. This hormone controls how the body uses and stores energy.  Hypothyroidism is the medical term for when a person does not make enough thyroid hormone. People sometimes confuse this condition with HYPERthyroidism, which is when a person makes too much thyroid hormone. Some people with hypothyroidism have no symptoms. But most people feel tired. That can make the condition hard to diagnose, because a lot of conditions can make you tired.  Other symptoms of hypothyroidism include: lack of energy, getting cold easily, developing coarse or thin hair, getting constipated.  You were started on levothyroxine thyroid hormone treatment for your hypothyroidism. We recommend you follow up with an endocrinologist after discharge.

## 2021-09-09 NOTE — DISCHARGE NOTE PROVIDER - CARE PROVIDER_API CALL
Luís Man (DO)  Internal Medicine  [address] [place] [address]  Phone: [phone]  Fax: [phone]  Follow Up Time: 1 week    Tristan Soliman (DPM)  Podiatric Medicine and Surgery  [address] [facility], [address]st [address], [address]  [place] [address]  Phone: [phone]  Fax: [phone]  Follow Up Time: 1 month    Justin Alan  INTERNAL MEDICINE  [facility] [place] [address]  Phone: [phone]  Fax: [phone]  Follow Up Time: 2 weeks

## 2021-09-09 NOTE — PROGRESS NOTE ADULT - ASSESSMENT
70 y/o f w/ pmhx of hypothyroidism, GERD c/o epigastric pain, dyspepsia, and jaundice with n/v/ diaphoresis, in the setting of hyperglycemia and elevated ketone bodies all suggestive of a presentation of DKA diagnosis secondary to unknown DM. Pt also found to have transaminitis with hyperbilirubinemia and jaundice on exam:    # Epigastric pain   # NBNB emesis  # Hyperglycemia, HAGMA, and Ketonuria consistent with DKA for newly diagnosed Diabetes RESOLVED  - BG on admission ~500, AG 21, LA 2.2, B-hydroxybutrate 1.0,  A1c is 8.7  - s/p insulin gtt in unit and downgraded to floor  -Fingerstick controlled 110s-150s  - c/w lantus 9u qAM, humalog 3u AC, SSI  - C/w Carb Consistent diet  - will need outpatient PMD, ophth, podiatry and endocrine referral set up  - Diabetes educator c/s placed  - Recommend atorvastatin 40mg daily  - starting on ASA 81mg daily    # Transaminitis   #Obstructive Jaundice 2/2 choledocholithiasis   - AST: 335->417-->343-->371-->299  - ALT: 414->536-->587-->675-->630  - Tbili: 2.8-->3.1-->3.9-->8.2-->5.4-->2.9  -PT/PTT/INR wnl, no signs of acute liver failure  -RUQ U/S shows hepatic steatosis with diffuse biliary duct dilation, CBD 1cm  -Labs negative for Hep B, C, A, Hemochromatosis, and Yair disease, alpha 1 antitrypsindeficiency  -MRCP- Distal CBD dilation of 1.1cm with 7mm obstructive stone   -s/p ERCP on 9/9 with removal of stones/ debris, as well as brushing of a stricture, and placement of a plastic Biliary stent.   -Holding atorvastatin 40mg until LFTs stable    # Accelerated HTN- BP elevated 130s-150s   - C/w lisinopril 20mg q24h  - starting low and slow as patient has never been on anti hypertensive therapy. will titrate up lisinopril as needed    #Hypothyroidism:  - TSH elevated 17.94, f/u T4    #Hypokalemia  -K 3.4, repleted in the morning  #GERD  -C/w PPI      -    DVT PPX  GI PPX  CHG  FULL CODE    #Progress Note Handoff  Pending (specify):  DM educator, ERCP  Family discussion: patient updated. in agreement of plan. all questions answered. Updated daughter Anna(PICU nurse in [place]), [phone] and Angélica [phone]  Disposition: Unknown at this time________     70 y/o f w/ pmhx of hypothyroidism, GERD c/o epigastric pain, dyspepsia, and jaundice with n/v, diaphoresis, in the setting of hyperglycemia and elevated ketone bodies all suggestive of a presentation of DKA diagnosis secondary to unknown DM. Pt also found to have transaminitis with hyperbilirubinemia and jaundice on exam:    # Epigastric pain   # NBNB emesis  # Hyperglycemia, HAGMA, and Ketonuria consistent with DKA for newly diagnosed Diabetes RESOLVED  - BG on admission ~500, AG 21, LA 2.2, B-hydroxybutrate 1.0,  A1c is 8.7  - s/p insulin gtt in unit and downgraded to floor  -Fingerstick controlled 110s-150s  - c/w lantus 9u qAM, humalog 3u AC, SSI  - C/w Carb Consistent diet  - will need outpatient PMD, ophth, podiatry and endocrine referral set up  - Diabetes educator c/s placed  - Recommend atorvastatin 40mg daily  - starting on ASA 81mg daily    # Transaminitis improving  #Obstructive Jaundice 2/2 choledocholithiasis   - AST: 335->417-->343-->371-->299  - ALT: 414->536-->587-->675-->630  - Tbili: 2.8-->3.1-->3.9-->8.2-->5.4-->2.9  -PT/PTT/INR wnl, no signs of acute liver failure  -RUQ U/S shows hepatic steatosis with diffuse biliary duct dilation, CBD 1cm  -Labs negative for Hep B, C, A, Hemochromatosis, and Yair disease, alpha 1 antitrypsindeficiency  -MRCP- Distal CBD dilation of 1.1cm with 7mm obstructive stone   -s/p ERCP on 9/9 with removal of stones/ debris, as well as brushing of a stricture, and placement of a plastic Biliary stent.   -Holding atorvastatin 40mg until LFTs stable  -Advance diet to consistent carb    # Accelerated HTN- BP elevated 130s-150s   - C/w lisinopril 20mg q24h  - starting low and slow as patient has never been on anti hypertensive therapy. will titrate up lisinopril as needed    #Subclinical Hypothyroidism  - TSH elevated 17.94, TSH 7.4  -no clinical symptoms of hypothyroidism  -continue with on Levothyroxine 125ucg at bedtime    #Hypokalemia  -K 3.4, repleted in the morning    #GERD  -C/w PPI      -    DVT PPX  GI PPX  CHG  FULL CODE    #Progress Note Handoff  Pending (specify):  DM educator, ERCP  Family discussion: patient updated. in agreement of plan. all questions answered. Updated daughter Anna(PICU nurse in [place]), [phone] and Angélica [phone]  Disposition: Unknown at this time________

## 2021-09-09 NOTE — PROGRESS NOTE ADULT - ASSESSMENT
Patient is a 70 y/o female with PMHx  of hypothyroidism , GERD and prior CCY 10 years prior, who presented with epigastric pain. She has been followed by [facility] and is s/p ERCP done with sphincterotomy , removal of stones/ debris, as well as brushing of a stricture, and placement of a plastic Biliary stent. Patient feels well this morning and has no pain, blacks stools, nausea, emesis, or fevers. Please obtain am CBC and CMP. May advanced diet to low fat. Patient will need outpatient follow up as she has plastic Biliary stent that will need to be removed in 4-6 weeks.     CBD stone/ CBD stricture/ S/P ERCP  - ERCP done 9/8 with sphincterotomy , removal of stones/ debris, as well as brushing of a stricture, and placement of a plastic Biliary stent  - AM CBC and CMP  - May advanced diet to low fat  - Patient aware that follow up is needed for ERCP to remove stent in 4-6 weeks- office aware of patient and has contact information to make appointment - Office number is [phone]  - Likely can be discharged today if LFT improved, Hgb stable. and continues to tolerate diet

## 2021-09-09 NOTE — DISCHARGE NOTE PROVIDER - NSDCCPGOAL_GEN_ALL_CORE_FT
Adequate: hears normal conversation without difficulty To get better and follow your care plan as instructed.

## 2021-09-09 NOTE — PROGRESS NOTE ADULT - PROVIDER SPECIALTY LIST ADULT
Internal Medicine
Endocrinology
Hospitalist
Internal Medicine
Gastroenterology
Hospitalist
Endocrinology

## 2021-09-09 NOTE — DISCHARGE NOTE PROVIDER - NSDCMRMEDTOKEN_GEN_ALL_CORE_FT
aspirin 81 mg oral tablet, chewable: 1 tab(s) orally once a day  glucometer (per patient&#x27;s insurance): Test blood sugars four times a day. Dispense #1 glucometer.  levothyroxine 125 mcg (0.125 mg) oral tablet: 1 tab(s) orally once a day (at bedtime)  lisinopril 20 mg oral tablet: 1 tab(s) orally once a day

## 2021-09-09 NOTE — DISCHARGE NOTE PROVIDER - HOSPITAL COURSE
70 y/o f w/ pmhx of hypothyroidism not complaint with her synthroid, has not follow up with her physician in year, GERD presented with epigastric pain and acid taste in mouth for last 3 days. The abdominal pain was sharp, radiated to lower back on L side. Pain was associated with nausea and vomiting, nbnb, diaphoresis. She denied any  fever, chills, cp,  pleuritic cp, sob, palpitations, cough, ha/lh/dizziness, numbness/tingling, neck pain/ stiffness, abd pain, constipation, melena/brbpr, sick contacts, recent travel or rash. Patient is non compliant with any health or screening measures and has not been to her  PMD in a decade.   She was hemodynamically stable in the ED but found to have a blood glucose of 500, AG 21, LA 2.2, B-hydroxybutrate 1.0 and A1c is 8.7. Pt was treated in the ICU for DKA and started on insulin gtt in unit. Her gap closed and she was downgraded to the floor. Additionnally, pt was found to have transaminitis with obstructive jaundice on admission. She had a MRCP which revealed CBD dilation of 1.1cm with a 7mm stone in the distal CBD. Pt is now s/p ERCP on 9/8/21 tolerating full carb consistent diet and no longer complaining of epigastric pain, nausea, vomiting.    70 y/o f w/ pmhx of hypothyroidism not complaint with her synthroid, has not follow up with her physician in year, GERD presented with epigastric pain and acid taste in mouth for last 3 days. The abdominal pain was sharp, radiated to lower back on L side. Pain was associated with nausea and vomiting, nbnb, diaphoresis. She denied any  fever, chills, cp,  pleuritic cp, sob, palpitations, cough, ha/lh/dizziness, numbness/tingling, neck pain/ stiffness, abd pain, constipation, melena/brbpr, sick contacts, recent travel or rash. Patient is non compliant with any health or screening measures and has not been to her  PMD in a decade.   She was hemodynamically stable in the ED but found to have a blood glucose of 500, AG 21, LA 2.2, B-hydroxybutrate 1.0 and A1c is 8.7. Pt was treated in the ICU for DKA and started on insulin gtt in unit. Her gap closed and she was downgraded to the floor. Additionnally, pt was found to have transaminitis with obstructive jaundice on admission. She had a MRCP which revealed CBD dilation of 1.1cm with a 7mm stone in the distal CBD. Pt is now s/p ERCP on 9/8 with removal of stones/ debris, as well as brushing of a stricture, and placement of a plastic Biliary stent.   on 9/8/21 tolerating full carb consistent diet and no longer complaining of epigastric pain, nausea, vomiting.    70 y/o f w/ pmhx of hypothyroidism not complaint with her synthroid, has not follow up with her physician in year, GERD presented with epigastric pain and acid taste in mouth for last 3 days. The abdominal pain was sharp, radiated to lower back on L side. Pain was associated with nausea and vomiting, nbnb, diaphoresis. She denied any  fever, chills, cp,  pleuritic cp, sob, palpitations, cough, ha/lh/dizziness, numbness/tingling, neck pain/ stiffness, abd pain, constipation, melena/brbpr, sick contacts, recent travel or rash. Patient is non compliant with any health or screening measures and has not been to her  PMD in a decade.   She was hemodynamically stable in the ED but found to have a blood glucose of 500, AG 21, LA 2.2, B-hydroxybutrate 1.0 and A1c is 8.7. Pt was treated in the ICU for DKA and started on insulin gtt in unit. Her gap closed and she was downgraded to the floor. Additionnally, pt was found to have transaminitis with obstructive jaundice on admission. She had a MRCP which revealed CBD dilation of 1.1cm with a 7mm stone in the distal CBD. Pt is now s/p ERCP on 9/8 with removal of stones/ debris, as well as brushing of a stricture, and placement of a plastic Biliary stent.   on 9/8/21 tolerating full carb consistent diet and no longer complaining of epigastric pain, nausea, vomiting.     70 y/o f w/ pmhx of hypothyroidism, GERD c/o epigastric pain, dyspepsia, and jaundice with n/v, diaphoresis, in the setting of hyperglycemia and elevated ketone bodies all suggestive of a presentation of DKA diagnosis secondary to unknown DM. Pt also found to have transaminitis with hyperbilirubinemia and jaundice on exam:    # Epigastric pain   # NBNB emesis  # Hyperglycemia, HAGMA, and Ketonuria consistent with DKA for newly diagnosed Diabetes RESOLVED  - BG on admission ~500, AG 21, LA 2.2, B-hydroxybutrate 1.0,  A1c is 8.7  - s/p insulin gtt in unit and downgraded to floor  -Fingerstick controlled 110s-150s  - s/p lantus 9u qAM, humalog 3u AC, SSI in hospital  -Pt to be d/c on no diabetes medications just diet control  - C/w Carb Consistent diet, DASH diet  - C/w atorvastatin 40mg daily  - C/w ASA 81mg daily  -F/u with PMD, ophtolmology, and podiatry    # Transaminitis improving  #Obstructive Jaundice 2/2 choledocholithiasis   - AST: 335->417-->343-->371-->299  - ALT: 414->536-->587-->675-->630  - Tbili: 2.8-->3.1-->3.9-->8.2-->5.4-->2.9  -PT/PTT/INR wnl, no signs of acute liver failure  -RUQ U/S shows hepatic steatosis with diffuse biliary duct dilation, CBD 1cm  -Labs negative for Hep B, C, A, Hemochromatosis, and Yair disease, alpha 1 antitrypsindeficiency  -MRCP- Distal CBD dilation of 1.1cm with 7mm obstructive stone   -s/p ERCP on 9/9 with removal of stones/ debris, as well as brushing of a stricture, and placement of a plastic Biliary stent.   -Holding atorvastatin 40mg until LFTs stable  -Advanced diet to consistent carb    # Accelerated HTN- BP elevated 130s-150s   - C/w lisinopril 20mg q24h    #Subclinical Hypothyroidism  - TSH elevated 17.94, TSH 7.4  -no clinical symptoms of hypothyroidism  -continue with Levothyroxine 125ucg at bedtime    #Hypokalemia-RESOLVED  -K 3.4, repleted in the morning    #GERD  -C/w PPI      -    DVT PPX  GI PPX  CHG  FULL CODE    #Progress Note Handoff  Pending (specify):  DM educator, ERCP  Family discussion: patient updated. in agreement of plan. all questions answered. Updated daughter Anna(PICU nurse in [place]), [phone] and Angélica [phone]  Disposition: Unknown at this time________     72 y/o f w/ pmhx of hypothyroidism not complaint with her synthroid, has not follow up with her physician in year, GERD presented with epigastric pain and acid taste in mouth for last 3 days. The abdominal pain was sharp, radiated to lower back on L side. Pain was associated with nausea and vomiting, nbnb, diaphoresis. She denied any  fever, chills, cp,  pleuritic cp, sob, palpitations, cough, ha/lh/dizziness, numbness/tingling, neck pain/ stiffness, abd pain, constipation, melena/brbpr, sick contacts, recent travel or rash. Patient is non compliant with any health or screening measures and has not been to her  PMD in a decade.   She was hemodynamically stable in the ED but found to have a blood glucose of 500, AG 21, LA 2.2, B-hydroxybutrate 1.0 and A1c is 8.7. Pt was treated in the ICU for DKA and started on insulin gtt in unit. Her gap closed and she was downgraded to the floor. Additionnally, pt was found to have transaminitis with obstructive jaundice on admission. She had a MRCP which revealed CBD dilation of 1.1cm with a 7mm stone in the distal CBD. Pt is now s/p ERCP on 9/8 with removal of stones/ debris, as well as brushing of a stricture, and placement of a plastic Biliary stent.   on 9/8/21 tolerating full carb consistent diet and no longer complaining of epigastric pain, nausea, vomiting.     72 y/o f w/ pmhx of hypothyroidism, GERD c/o epigastric pain, dyspepsia, and jaundice with n/v, diaphoresis, in the setting of hyperglycemia and elevated ketone bodies all suggestive of a presentation of DKA diagnosis secondary to unknown DM. Pt also found to have transaminitis with hyperbilirubinemia and jaundice on exam:    # Epigastric pain   # NBNB emesis  # Hyperglycemia, HAGMA, and Ketonuria consistent with DKA for newly diagnosed Diabetes RESOLVED  - BG on admission ~500, AG 21, LA 2.2, B-hydroxybutrate 1.0,  A1c is 8.7  - s/p insulin gtt in unit and downgraded to floor  -Fingerstick controlled 110s-150s  - s/p lantus 9u qAM, humalog 3u AC, SSI in hospital  -Pt to be d/c on no diabetes medications just diet control  - C/w Carb Consistent diet, DASH diet  - C/w atorvastatin 40mg daily  - C/w ASA 81mg daily  -F/u with PMD, ophtolmology, and podiatry    # Transaminitis improving  #Obstructive Jaundice 2/2 choledocholithiasis   - AST: 335->417-->343-->371-->299  - ALT: 414->536-->587-->675-->630  - Tbili: 2.8-->3.1-->3.9-->8.2-->5.4-->2.9  -PT/PTT/INR wnl, no signs of acute liver failure  -RUQ U/S shows hepatic steatosis with diffuse biliary duct dilation, CBD 1cm  -Labs negative for Hep B, C, A, Hemochromatosis, and Yair disease, alpha 1 antitrypsindeficiency  -MRCP- Distal CBD dilation of 1.1cm with 7mm obstructive stone   -s/p ERCP on 9/9 with removal of stones/ debris, as well as brushing of a stricture, and placement of a plastic Biliary stent.   -Holding atorvastatin 40mg until LFTs stable  -Advanced diet to consistent carb    # Accelerated HTN- BP elevated 130s-150s   - C/w lisinopril 20mg q24h    #Subclinical Hypothyroidism  - TSH elevated 17.94, TSH 7.4  -no clinical symptoms of hypothyroidism  -continue with Levothyroxine 125ucg at bedtime    #Hypokalemia-RESOLVED  -K 3.4, repleted in the morning    #GERD  -C/w PPI      -    DVT PPX  GI PPX  CHG  FULL CODE    #Progress Note Handoff  Pending (specify):  LFTs, Diet  Family discussion: patient updated. in agreement of plan. all questions answered. Updated daughter Anna(PICU nurse in [place]), [phone] and Angélica [phone]  Disposition: Home     70 y/o f w/ pmhx of hypothyroidism not complaint with her synthroid, has not follow up with her physician in year, GERD presented with epigastric pain and acid taste in mouth for last 3 days. The abdominal pain was sharp, radiated to lower back on L side. Pain was associated with nausea and vomiting, nbnb, diaphoresis. She denied any  fever, chills, cp,  pleuritic cp, sob, palpitations, cough, ha/lh/dizziness, numbness/tingling, neck pain/ stiffness, abd pain, constipation, melena/brbpr, sick contacts, recent travel or rash. Patient is non compliant with any health or screening measures and has not been to her  PMD in a decade.   She was hemodynamically stable in the ED but found to have a blood glucose of 500, AG 21, LA 2.2, B-hydroxybutrate 1.0 and A1c is 8.7. Pt was treated in the ICU for DKA and started on insulin gtt in unit. Her gap closed and she was downgraded to the floor. Additionnally, pt was found to have transaminitis with obstructive jaundice on admission. She had a MRCP which revealed CBD dilation of 1.1cm with a 7mm stone in the distal CBD. Pt is now s/p ERCP on 9/8 with removal of stones/ debris, as well as brushing of a stricture, and placement of a plastic Biliary stent.   on 9/8/21 tolerating full carb consistent diet and no longer complaining of epigastric pain, nausea, vomiting.     70 y/o f w/ pmhx of hypothyroidism, GERD c/o epigastric pain, dyspepsia, and jaundice with n/v, diaphoresis, in the setting of hyperglycemia and elevated ketone bodies all suggestive of a presentation of DKA diagnosis secondary to unknown DM. Pt also found to have transaminitis with hyperbilirubinemia and jaundice on exam:    # Epigastric pain   # NBNB emesis  # Hyperglycemia, HAGMA, and Ketonuria consistent with DKA for newly diagnosed Diabetes RESOLVED  - BG on admission ~500, AG 21, LA 2.2, B-hydroxybutrate 1.0,  A1c is 8.7  - s/p insulin gtt in unit and downgraded to floor  -Fingerstick controlled 110s-150s  - s/p lantus 9u qAM, humalog 3u AC, SSI in hospital  -Pt to be d/c on no diabetes medications just diet control  - C/w Carb Consistent diet, DASH diet  - C/w atorvastatin 40mg daily  - C/w ASA 81mg daily  -F/u with PMD, ophtolmology, and podiatry    # Transaminitis improving  #Obstructive Jaundice 2/2 choledocholithiasis   - AST: 335->417-->343-->371-->299  - ALT: 414->536-->587-->675-->630  - Tbili: 2.8-->3.1-->3.9-->8.2-->5.4-->2.9  -PT/PTT/INR wnl, no signs of acute liver failure  -RUQ U/S shows hepatic steatosis with diffuse biliary duct dilation, CBD 1cm  -Labs negative for Hep B, C, A, Hemochromatosis, and Yair disease, alpha 1 antitrypsindeficiency  -MRCP- Distal CBD dilation of 1.1cm with 7mm obstructive stone   -s/p ERCP on 9/9 with removal of stones/ debris, as well as brushing of a stricture, and placement of a plastic Biliary stent.   -Holding atorvastatin 40mg until LFTs stable  -Advanced diet to consistent carb    # Accelerated HTN- BP elevated 130s-150s   - C/w lisinopril 20mg q24h    #Subclinical Hypothyroidism  - TSH elevated 17.94, TSH 7.4  -no clinical symptoms of hypothyroidism  -continue with Levothyroxine 125ucg at bedtime    #Hypokalemia-RESOLVED  -K 3.4, repleted in the morning    #GERD  -C/w PPI      Family discussion: patient updated. in agreement of plan. all questions answered. Updated daughter Anna(PICU nurse in [place]), [phone] and Angélica [phone]  Disposition: Home     72 y/o f w/ pmhx of hypothyroidism not complaint with her synthroid, has not follow up with her physician in year, GERD presented with epigastric pain and acid taste in mouth for last 3 days. The abdominal pain was sharp, radiated to lower back on L side. Pain was associated with nausea and vomiting, nbnb, diaphoresis. She denied any  fever, chills, cp,  pleuritic cp, sob, palpitations, cough, ha/lh/dizziness, numbness/tingling, neck pain/ stiffness, abd pain, constipation, melena/brbpr, sick contacts, recent travel or rash. Patient is non compliant with any health or screening measures and has not been to her  PMD in a decade.   She was hemodynamically stable in the ED but found to have a blood glucose of 500, AG 21, LA 2.2, B-hydroxybutrate 1.0 and A1c is 8.7. Pt was treated in the ICU for DKA and started on insulin gtt in unit. Her gap closed and she was downgraded to the floor. Additionnally, pt was found to have transaminitis with obstructive jaundice on admission. She had a MRCP which revealed CBD dilation of 1.1cm with a 7mm stone in the distal CBD. Pt is now s/p ERCP on 9/8 with removal of stones/ debris, as well as brushing of a stricture, and placement of a plastic Biliary stent.   on 9/8/21 tolerating full carb consistent diet and no longer complaining of epigastric pain, nausea, vomiting.     72 y/o f w/ pmhx of hypothyroidism, GERD c/o epigastric pain, dyspepsia, and jaundice with n/v, diaphoresis, in the setting of hyperglycemia and elevated ketone bodies all suggestive of a presentation of DKA diagnosis secondary to unknown DM. Pt also found to have transaminitis with hyperbilirubinemia and jaundice on exam:    # Epigastric pain   # NBNB emesis  # Hyperglycemia, HAGMA, and Ketonuria consistent with DKA for newly diagnosed Diabetes RESOLVED  - BG on admission ~500, AG 21, LA 2.2, B-hydroxybutrate 1.0,  A1c is 8.7  - s/p insulin gtt in unit and downgraded to floor  -Fingerstick controlled 110s-150s  - s/p lantus 9u qAM, humalog 3u AC, SSI in hospital  -Pt to be d/c on no diabetes medications just diet control  - C/w Carb Consistent diet, DASH diet  - C/w atorvastatin 40mg daily  - C/w ASA 81mg daily  -F/u with PMD, ophtolmology, and podiatry    # Transaminitis improving  #Obstructive Jaundice 2/2 choledocholithiasis   - AST: 335->417-->343-->371-->299  - ALT: 414->536-->587-->675-->630  - Tbili: 2.8-->3.1-->3.9-->8.2-->5.4-->2.9  -PT/PTT/INR wnl, no signs of acute liver failure  -RUQ U/S shows hepatic steatosis with diffuse biliary duct dilation, CBD 1cm  -Labs negative for Hep B, C, A, Hemochromatosis, and Yair disease, alpha 1 antitrypsindeficiency  -MRCP- Distal CBD dilation of 1.1cm with 7mm obstructive stone   -s/p ERCP on 9/9 with removal of stones/ debris, as well as brushing of a stricture, and placement of a plastic Biliary stent.   -Holding atorvastatin 40mg until LFTs stable  -Advanced diet to consistent carb    # Accelerated HTN- BP elevated 130s-150s   - C/w lisinopril 20mg q24h    #Subclinical Hypothyroidism  - TSH elevated 17.94, TSH 7.4  -no clinical symptoms of hypothyroidism  -continue with Levothyroxine 125ucg at bedtime    #Hypokalemia-RESOLVED  -K 3.4, repleted in the morning    #GERD  -C/w PPI      Family discussion: patient updated. in agreement of plan. all questions answered. Updated daughter Anna(PICU nurse in [place]), [phone] and Angélica [phone]  Disposition: Home      Attending Attestation:   Patient seen and examined on day of discharge.     GENERAL: elderly F, NAD, non toxic appearing  EYES: anicteric, EOMI   ENT: hearing grossly intact, oropharynx clear, MMM  PSYCH: no agitation, baseline mentation  NERVOUS SYSTEM:  Alert & Oriented X3, CN 2-12 grossly intact  PULMONARY: Clear to auscultation bilaterally; No rales, rhonchi, wheezing, or rubs  CARDIOVASCULAR: Regular rate and rhythm; No murmurs, rubs, or gallops, no LE edema  GI: Soft, Nontender, Nondistended; Bowel sounds present  EXTREMITIES:  2+ Peripheral Pulses, No clubbing, cyanosis  LYMPH: No lymphadenopathy noted  SKIN: No rashes or lesions, jaundice improved       I have spent >30m preparing discharge and counselling patient on discharge instructions.   Rosina Rand, DO

## 2021-09-09 NOTE — DISCHARGE NOTE NURSING/CASE MANAGEMENT/SOCIAL WORK - PATIENT PORTAL LINK FT
You can access the FollowMyHealth Patient Portal offered by [facility] by registering at the following website: [email or web address]. By joining [facility]’s FollowMyHealth portal, you will also be able to view your health information using other applications (apps) compatible with our system.

## 2021-09-09 NOTE — PROGRESS NOTE ADULT - ATTENDING COMMENTS
Patient seen and examined assessment and plan above, given information to follow-up as an outpatient
Patient with high likelihood choledocholithiasis awaiting MRCP. Possible ERCP to follow
70 y/o F who presented with epigastric pain, abnormal LFTs, MRCP shows choledocholithiasis. will plan for ERCP tomorrow with advanced team

## 2021-09-09 NOTE — DISCHARGE NOTE PROVIDER - NSDCFUADDAPPT_GEN_ALL_CORE_FT
Please follow up with PCP in 1 week to check LFT's and determine if it is safe to restart cholesterol medication.     Please restrict carbohydrate intake, and start exercising so as to optimize diabetic control. Follow up with Endocrinology.

## 2021-09-09 NOTE — PROGRESS NOTE ADULT - SUBJECTIVE AND OBJECTIVE BOX
Patient is a 70 y/o female with PMHx  of hypothyroidism , GERD and prior CCY 10 years prior, who presented with epigastric pain. She has been followed by [facility] and is s/p ERCP done with sphincterotomy , removal of stones/ debris, as well as brushing of a stricture, and placement of a plastic Biliary stent. Patient feels well this morning and has no pain, blacks stools, nausea, emesis, or fevers.       PAST MEDICAL & SURGICAL HISTORY:  Hypothyroid  GERD  Prior CCY        MEDICATIONS  (STANDING):  aspirin  chewable 81 milliGRAM(s) Oral daily  chlorhexidine 4% Liquid 1 Application(s) Topical <User Schedule>  dextrose 40% Gel 15 Gram(s) Oral once  dextrose 5%. 1000 milliLiter(s) (50 mL/Hr) IV Continuous <Continuous>  dextrose 5%. 1000 milliLiter(s) (100 mL/Hr) IV Continuous <Continuous>  dextrose 50% Injectable 25 Gram(s) IV Push once  dextrose 50% Injectable 12.5 Gram(s) IV Push once  dextrose 50% Injectable 25 Gram(s) IV Push once  glucagon  Injectable 1 milliGRAM(s) IntraMuscular once  insulin glargine Injectable (LANTUS) 9 Unit(s) SubCutaneous every morning  insulin lispro (ADMELOG) corrective regimen sliding scale   SubCutaneous three times a day before meals  insulin lispro Injectable (ADMELOG) 3 Unit(s) SubCutaneous three times a day before meals  levothyroxine 125 MICROGram(s) Oral at bedtime  lisinopril 20 milliGRAM(s) Oral daily  pantoprazole    Tablet 40 milliGRAM(s) Oral before breakfast    MEDICATIONS  (PRN):  traMADol 25 milliGRAM(s) Oral four times a day PRN Mild Pain (1 - 3)      Allergies  No Known Allergies    Review of Systems  General:  Denies Fatigue, Denies Fever, Denies Weakness ,Denies Weight Loss   HEENT: Denies Trouble Swallowing ,Denies  Sore Throat , Denies Change in hearing/vision/speech ,Denies Dizziness    Cardio: Denies  Chest Pain , Palpitations    Respiratory: Denies worsening of SOB, Denies Cough  Abdomen: See detailed HPI  Neuro: Denies Headache Denies Dizziness, Denies Paresthesias  MSK: Denies pain in Bones/Joints/Muscles   Psych: Patient denies depression, denies suicidal or homicidal ideations  Integ: Patient Denies rash, or new skin lesions        Vital Signs  T(F): 97.3 (09 Sep 2021 06:34), Max: 98.5 (08 Sep 2021 21:06)  HR: 81 (09 Sep 2021 06:34) (49 - 81)  BP: 131/62 (09 Sep 2021 06:34) (124/85 - 168/76)  RR: 18 (09 Sep 2021 06:34) (14 - 18)  SpO2: 94% (08 Sep 2021 15:02) (94% - 96%)  Physical Exam  Gen: NAD  HEENT: NC/AT, Mucosal Membranes Moist  Cardio: S1/S2 No S3/S4, Regular  Resp: CTA B/L  Abdomen: Soft, ND/NT  Neuro: AAOx3  Extremities: FROM x 4  Skin: No jaundice         Labs:                        14.8   12.23 )-----------( 297      ( 07 Sep 2021 21:36 )             44.8     09-07    136  |  98  |  18  ----------------------------<  55<L>  3.4<L>   |  25  |  0.6<L>    Ca    9.4      07 Sep 2021 21:36  Mg     2.0     09-07          RADIOLOGY & ADDITIONAL STUDIES:  MR Abdomen w/wo IV Cont (09.06.21 @ 18:58) >  IMPRESSION:    Status post cholecystectomy. Distal CBD stone identified. CBD dilated to 1.1 cm.

## 2021-09-09 NOTE — DISCHARGE NOTE PROVIDER - PROVIDER TOKENS
PROVIDER:[TOKEN:[[id number]],FOLLOWUP:[1 week]],PROVIDER:[TOKEN:[[id number]],FOLLOWUP:[1 month]],PROVIDER:[TOKEN:[[id number]],FOLLOWUP:[2 weeks]]

## 2021-09-09 NOTE — PROGRESS NOTE ADULT - ASSESSMENT
continue l-thyroxine. stop insulin, will try and control type 2 diabetes with diet and exercise, clearly needs weight losss. please send prescription for l-thyroxine and contour next meter test strips tests once a day, and lancets diagnosis e11.65 type 2 diabetes. follow up primary care only.

## 2021-09-10 PROBLEM — Z00.00 ENCOUNTER FOR PREVENTIVE HEALTH EXAMINATION: Status: ACTIVE | Noted: 2021-09-10

## 2021-09-10 LAB
HCV RNA FLD QL NAA+PROBE: SIGNIFICANT CHANGE UP
NON-GYNECOLOGICAL CYTOLOGY STUDY: SIGNIFICANT CHANGE UP

## 2021-09-11 LAB
HSV1 AB FLD QL: NEGATIVE — SIGNIFICANT CHANGE UP
HSV2 AB FLD-ACNC: NEGATIVE — SIGNIFICANT CHANGE UP

## 2021-09-13 LAB — HEV IGM SER QL: ABNORMAL

## 2021-09-15 DIAGNOSIS — K80.51 CALCULUS OF BILE DUCT WITHOUT CHOLANGITIS OR CHOLECYSTITIS WITH OBSTRUCTION: ICD-10-CM

## 2021-09-15 DIAGNOSIS — R10.9 UNSPECIFIED ABDOMINAL PAIN: ICD-10-CM

## 2021-09-15 DIAGNOSIS — E87.6 HYPOKALEMIA: ICD-10-CM

## 2021-09-15 DIAGNOSIS — E11.10 TYPE 2 DIABETES MELLITUS WITH KETOACIDOSIS WITHOUT COMA: ICD-10-CM

## 2021-09-15 DIAGNOSIS — K21.9 GASTRO-ESOPHAGEAL REFLUX DISEASE WITHOUT ESOPHAGITIS: ICD-10-CM

## 2021-09-15 DIAGNOSIS — E66.9 OBESITY, UNSPECIFIED: ICD-10-CM

## 2021-09-15 DIAGNOSIS — K75.9 INFLAMMATORY LIVER DISEASE, UNSPECIFIED: ICD-10-CM

## 2021-09-15 DIAGNOSIS — K76.0 FATTY (CHANGE OF) LIVER, NOT ELSEWHERE CLASSIFIED: ICD-10-CM

## 2021-09-15 DIAGNOSIS — E03.8 OTHER SPECIFIED HYPOTHYROIDISM: ICD-10-CM

## 2021-09-15 DIAGNOSIS — E87.1 HYPO-OSMOLALITY AND HYPONATREMIA: ICD-10-CM

## 2021-09-15 DIAGNOSIS — R74.01 ELEVATION OF LEVELS OF LIVER TRANSAMINASE LEVELS: ICD-10-CM

## 2021-09-15 DIAGNOSIS — Z91.14 PATIENT'S OTHER NONCOMPLIANCE WITH MEDICATION REGIMEN: ICD-10-CM

## 2021-09-15 DIAGNOSIS — I10 ESSENTIAL (PRIMARY) HYPERTENSION: ICD-10-CM

## 2021-09-16 LAB — CMV DNA CSF QL NAA+PROBE: SIGNIFICANT CHANGE UP

## 2021-09-30 ENCOUNTER — APPOINTMENT (OUTPATIENT)
Age: 71
End: 2021-09-30
Payer: MEDICARE

## 2021-09-30 DIAGNOSIS — K80.50 CALCULUS OF BILE DUCT W/OUT CHOLANGITIS OR CHOLECYSTITIS W/OUT OBSTRUCTION: ICD-10-CM

## 2021-09-30 PROCEDURE — 99213 OFFICE O/P EST LOW 20 MIN: CPT

## 2021-09-30 NOTE — ASSESSMENT
[FreeTextEntry1] : 72 YO female: choledocholithiasis CBD stricture\par Average risk CRC past due for screening\par \par Plan:\par ERCP stent removal and reevaluation of the stricture\par Colonoscopy at a later time (scheduled)\par Follow up after the colonosocpy\par

## 2021-09-30 NOTE — PHYSICAL EXAM
[General Appearance - Alert] : alert [General Appearance - In No Acute Distress] : in no acute distress [Sclera] : the sclera and conjunctiva were normal [Outer Ear] : the ears and nose were normal in appearance [Neck Appearance] : the appearance of the neck was normal [] : no respiratory distress [Bowel Sounds] : normal bowel sounds [Abdomen Soft] : soft [No CVA Tenderness] : no ~M costovertebral angle tenderness [Abnormal Walk] : normal gait [Skin Color & Pigmentation] : normal skin color and pigmentation [Cranial Nerves] : cranial nerves 2-12 were intact [Oriented To Time, Place, And Person] : oriented to person, place, and time

## 2021-09-30 NOTE — HISTORY OF PRESENT ILLNESS
[de-identified] : 72 yo F Hx of recently diagnosed DM after she was admitted with DKA. reported\par epigastric pain that radiates to the back decreased appetite (no weigh loss).\par SP CCY, found to have choledocholithiasis.\par SP ERCP with removal of a stone, stent placement and brushing of a CBD stricture that improved after stone removal. brushings: atypical, no clear malignant cells.\par No colonoscopy in the past.\par \par Denies abdominal pain.\par Feels much better except for some GERD which she treats with H2B PRN.

## 2021-10-17 ENCOUNTER — LABORATORY RESULT (OUTPATIENT)
Age: 71
End: 2021-10-17

## 2021-10-17 ENCOUNTER — OUTPATIENT (OUTPATIENT)
Dept: OUTPATIENT SERVICES | Facility: HOSPITAL | Age: 71
LOS: 1 days | Discharge: HOME | End: 2021-10-17

## 2021-10-17 DIAGNOSIS — Z11.59 ENCOUNTER FOR SCREENING FOR OTHER VIRAL DISEASES: ICD-10-CM

## 2021-10-20 ENCOUNTER — OUTPATIENT (OUTPATIENT)
Dept: OUTPATIENT SERVICES | Facility: HOSPITAL | Age: 71
LOS: 1 days | Discharge: HOME | End: 2021-10-20
Payer: MEDICARE

## 2021-10-20 ENCOUNTER — TRANSCRIPTION ENCOUNTER (OUTPATIENT)
Age: 71
End: 2021-10-20

## 2021-10-20 VITALS
SYSTOLIC BLOOD PRESSURE: 151 MMHG | TEMPERATURE: 98 F | HEIGHT: 63 IN | DIASTOLIC BLOOD PRESSURE: 73 MMHG | WEIGHT: 179.9 LBS | HEART RATE: 61 BPM | RESPIRATION RATE: 18 BRPM

## 2021-10-20 VITALS
RESPIRATION RATE: 17 BRPM | HEART RATE: 63 BPM | OXYGEN SATURATION: 99 % | DIASTOLIC BLOOD PRESSURE: 59 MMHG | SYSTOLIC BLOOD PRESSURE: 138 MMHG

## 2021-10-20 DIAGNOSIS — I26.99 OTHER PULMONARY EMBOLISM WITHOUT ACUTE COR PULMONALE: ICD-10-CM

## 2021-10-20 DIAGNOSIS — Z90.49 ACQUIRED ABSENCE OF OTHER SPECIFIED PARTS OF DIGESTIVE TRACT: Chronic | ICD-10-CM

## 2021-10-20 DIAGNOSIS — Z98.891 HISTORY OF UTERINE SCAR FROM PREVIOUS SURGERY: Chronic | ICD-10-CM

## 2021-10-20 PROCEDURE — 74328 X-RAY BILE DUCT ENDOSCOPY: CPT | Mod: 26

## 2021-10-20 PROCEDURE — 43275 ERCP REMOVE FORGN BODY DUCT: CPT

## 2021-10-20 PROCEDURE — 43264 ERCP REMOVE DUCT CALCULI: CPT | Mod: XU

## 2021-10-20 NOTE — CHART NOTE - NSCHARTNOTEFT_GEN_A_CORE
PACU ANESTHESIA ADMISSION NOTE      Procedure:   Post op diagnosis:      ____  Intubated  TV:______       Rate: ______      FiO2: ______    __x__  Patent Airway    __x__  Full return of protective reflexes    __x__  Full recovery from anesthesia / back to baseline status    Vitals  HR: 76  BP: 111/66  RR: 18  O2 Sat: 98%  Temp: 98.5    Mental Status:  __x__ Awake   ___x__ Alert   _____ Drowsy   _____ Sedated    Nausea/Vomiting:  __x__ NO  ______Yes,   See Post - Op Orders          Pain Scale (0-10):  _____    Treatment: ____ None    __x__ See Post - Op/PCA Orders    Post - Operative Fluids:   ____ Oral   __x__ See Post - Op Orders    Plan: Discharge when criteria met:   _x___Home       _____Floor     _____Critical Care   Other:_________________    Comments: Patient had smooth intraoperative event, no anesthesia complication.

## 2021-10-24 DIAGNOSIS — Z79.82 LONG TERM (CURRENT) USE OF ASPIRIN: ICD-10-CM

## 2021-10-24 DIAGNOSIS — K83.8 OTHER SPECIFIED DISEASES OF BILIARY TRACT: ICD-10-CM

## 2021-11-11 PROBLEM — E03.9 HYPOTHYROIDISM, UNSPECIFIED: Chronic | Status: ACTIVE | Noted: 2021-10-20

## 2021-11-11 PROBLEM — I10 ESSENTIAL (PRIMARY) HYPERTENSION: Chronic | Status: ACTIVE | Noted: 2021-10-20

## 2021-11-11 PROBLEM — E11.9 TYPE 2 DIABETES MELLITUS WITHOUT COMPLICATIONS: Chronic | Status: ACTIVE | Noted: 2021-10-20

## 2021-11-12 ENCOUNTER — OUTPATIENT (OUTPATIENT)
Dept: OUTPATIENT SERVICES | Facility: HOSPITAL | Age: 71
LOS: 1 days | Discharge: HOME | End: 2021-11-12

## 2021-11-12 ENCOUNTER — LABORATORY RESULT (OUTPATIENT)
Age: 71
End: 2021-11-12

## 2021-11-12 DIAGNOSIS — Z11.59 ENCOUNTER FOR SCREENING FOR OTHER VIRAL DISEASES: ICD-10-CM

## 2021-11-12 DIAGNOSIS — Z90.49 ACQUIRED ABSENCE OF OTHER SPECIFIED PARTS OF DIGESTIVE TRACT: Chronic | ICD-10-CM

## 2021-11-12 DIAGNOSIS — Z98.891 HISTORY OF UTERINE SCAR FROM PREVIOUS SURGERY: Chronic | ICD-10-CM

## 2021-11-15 ENCOUNTER — TRANSCRIPTION ENCOUNTER (OUTPATIENT)
Age: 71
End: 2021-11-15

## 2021-11-15 ENCOUNTER — OUTPATIENT (OUTPATIENT)
Dept: OUTPATIENT SERVICES | Facility: HOSPITAL | Age: 71
LOS: 1 days | Discharge: HOME | End: 2021-11-15
Payer: MEDICARE

## 2021-11-15 ENCOUNTER — RESULT REVIEW (OUTPATIENT)
Age: 71
End: 2021-11-15

## 2021-11-15 VITALS
DIASTOLIC BLOOD PRESSURE: 73 MMHG | HEIGHT: 64 IN | TEMPERATURE: 98 F | SYSTOLIC BLOOD PRESSURE: 139 MMHG | HEART RATE: 68 BPM | WEIGHT: 173.06 LBS | RESPIRATION RATE: 18 BRPM

## 2021-11-15 VITALS — HEART RATE: 74 BPM | SYSTOLIC BLOOD PRESSURE: 151 MMHG | RESPIRATION RATE: 18 BRPM | DIASTOLIC BLOOD PRESSURE: 84 MMHG

## 2021-11-15 DIAGNOSIS — Z90.49 ACQUIRED ABSENCE OF OTHER SPECIFIED PARTS OF DIGESTIVE TRACT: Chronic | ICD-10-CM

## 2021-11-15 DIAGNOSIS — Z98.891 HISTORY OF UTERINE SCAR FROM PREVIOUS SURGERY: Chronic | ICD-10-CM

## 2021-11-15 PROCEDURE — 88305 TISSUE EXAM BY PATHOLOGIST: CPT | Mod: 26

## 2021-11-15 PROCEDURE — 45380 COLONOSCOPY AND BIOPSY: CPT

## 2021-11-15 RX ORDER — SODIUM CHLORIDE 9 MG/ML
1000 INJECTION, SOLUTION INTRAVENOUS
Refills: 0 | Status: DISCONTINUED | OUTPATIENT
Start: 2021-11-15 | End: 2021-11-30

## 2021-11-15 NOTE — CHART NOTE - NSCHARTNOTEFT_GEN_A_CORE
PACU ANESTHESIA ADMISSION NOTE      Procedure: colonoscopy  Post op diagnosis:  screening    ____  Intubated  TV:______       Rate: ______      FiO2: ______    _x___  Patent Airway    _x___  Full return of protective reflexes    _x___  Full recovery from anesthesia / back to baseline status    Vitals:  T-98.1  HR: 80  BP: 108/66  RR: 18 (11-15-21 @ 14:26) (18 - 18)  SpO2: 98    Mental Status:  _x___ Awake   _____ Alert   _____ Drowsy   _____ Sedated    Nausea/Vomiting:  _x___  NO       ______Yes,   See Post - Op Orders         Pain Scale (0-10):  __0___    Treatment: _x___ None    ____ See Post - Op/PCA Orders    Post - Operative Fluids:   __x__ Oral   ____ See Post - Op Orders    Plan: Discharge:   _x___Home       _____Floor     _____Critical Care    _____  Other:_________________    Comments:  No anesthesia issues or complications noted.  Discharge when criteria met.

## 2021-11-15 NOTE — ASU DISCHARGE PLAN (ADULT/PEDIATRIC) - CALL YOUR DOCTOR IF YOU HAVE ANY OF THE FOLLOWING:
Bleeding that does not stop/Pain not relieved by Medications/Wound/Surgical Site with redness, or foul smelling discharge or pus/Numbness, tingling, color or temperature change to extremity/Nausea and vomiting that does not stop/Unable to urinate/Inability to tolerate liquids or foods/Increased irritability or sluggishness

## 2021-11-17 LAB — SURGICAL PATHOLOGY STUDY: SIGNIFICANT CHANGE UP

## 2021-11-23 DIAGNOSIS — D12.2 BENIGN NEOPLASM OF ASCENDING COLON: ICD-10-CM

## 2021-11-23 DIAGNOSIS — Z12.11 ENCOUNTER FOR SCREENING FOR MALIGNANT NEOPLASM OF COLON: ICD-10-CM

## 2021-11-23 DIAGNOSIS — I10 ESSENTIAL (PRIMARY) HYPERTENSION: ICD-10-CM

## 2021-11-23 DIAGNOSIS — K64.4 RESIDUAL HEMORRHOIDAL SKIN TAGS: ICD-10-CM

## 2021-11-23 DIAGNOSIS — E11.9 TYPE 2 DIABETES MELLITUS WITHOUT COMPLICATIONS: ICD-10-CM

## 2021-11-23 DIAGNOSIS — Z90.49 ACQUIRED ABSENCE OF OTHER SPECIFIED PARTS OF DIGESTIVE TRACT: ICD-10-CM

## 2021-11-23 DIAGNOSIS — K64.8 OTHER HEMORRHOIDS: ICD-10-CM

## 2021-11-23 DIAGNOSIS — Z79.82 LONG TERM (CURRENT) USE OF ASPIRIN: ICD-10-CM

## 2021-11-23 DIAGNOSIS — E03.9 HYPOTHYROIDISM, UNSPECIFIED: ICD-10-CM

## 2021-11-23 DIAGNOSIS — K57.30 DIVERTICULOSIS OF LARGE INTESTINE WITHOUT PERFORATION OR ABSCESS WITHOUT BLEEDING: ICD-10-CM

## 2021-11-23 DIAGNOSIS — Z79.811 LONG TERM (CURRENT) USE OF AROMATASE INHIBITORS: ICD-10-CM

## 2021-12-13 ENCOUNTER — APPOINTMENT (OUTPATIENT)
Dept: GASTROENTEROLOGY | Facility: CLINIC | Age: 71
End: 2021-12-13
Payer: MEDICARE

## 2021-12-13 VITALS — TEMPERATURE: 97.1 F | HEIGHT: 64 IN | BODY MASS INDEX: 29.37 KG/M2 | WEIGHT: 172 LBS

## 2021-12-13 DIAGNOSIS — D12.6 BENIGN NEOPLASM OF COLON, UNSPECIFIED: ICD-10-CM

## 2021-12-13 PROCEDURE — 99214 OFFICE O/P EST MOD 30 MIN: CPT

## 2021-12-13 RX ORDER — POLYETHYLENE GLYCOL 3350 AND ELECTROLYTES WITH LEMON FLAVOR 236; 22.74; 6.74; 5.86; 2.97 G/4L; G/4L; G/4L; G/4L; G/4L
236 POWDER, FOR SOLUTION ORAL
Qty: 1 | Refills: 0 | Status: ACTIVE | COMMUNITY
Start: 2021-12-13 | End: 1900-01-01

## 2021-12-13 RX ORDER — LORATADINE 10 MG
17 TABLET,DISINTEGRATING ORAL DAILY
Qty: 1 | Refills: 0 | Status: ACTIVE | COMMUNITY
Start: 2021-12-13 | End: 1900-01-01

## 2021-12-13 NOTE — HISTORY OF PRESENT ILLNESS
[___ Month(s) Ago] : [unfilled] month(s) ago [Test: ___] : [unfilled] [_________] : Performed [unfilled] [de-identified] : Post colonoscopy with food prep a small adenoma (4 mm) was removed by snare polypectomy.  Patient states she could not take the Dulcolax that was prescribed and took an over-the-counter alternative. [de-identified] : 72 yo F Hx of recently diagnosed DM after she was admitted with DKA. reported\par epigastric pain that radiates to the back decreased appetite (no weigh loss).\par SP CCY, found to have choledocholithiasis.\par SP ERCP with removal of a stone, stent placement and brushing of a CBD stricture that improved after stone removal. brushings: atypical, no clear malignant cells.\par No colonoscopy in the past.\par \par Denies abdominal pain.\par Feels much better except for some GERD which she treats with H2B PRN.

## 2021-12-13 NOTE — ASSESSMENT
[FreeTextEntry1] : 72 YO female: choledocholithiasis CBD stricture\par Average risk CRC past due for screening\par Poor prep on the previous colonoscopy\par \par Plan:\par Repeat colonoscopy after 1 week worth of MiraLAX\par Patient also was instructed to adopt a low residue diet the week prior to colonoscopy\par Follow-up after colonoscopy\par

## 2021-12-13 NOTE — REASON FOR VISIT
[Follow-Up: _____] : a [unfilled] follow-up visit [Consultation] : a consultation visit [FreeTextEntry1] : HFU post ERCP

## 2022-07-01 NOTE — PROGRESS NOTE ADULT - ATTENDING SUPERVISION STATEMENT
Fellow Muscle Hinge Flap Text: The defect edges were debeveled with a #15 scalpel blade.  Given the size, depth and location of the defect and the proximity to free margins a muscle hinge flap was deemed most appropriate.  Using a sterile surgical marker, an appropriate hinge flap was drawn incorporating the defect. The area thus outlined was incised with a #15 scalpel blade.  The skin margins were undermined to an appropriate distance in all directions utilizing iris scissors.

## 2022-07-22 NOTE — PATIENT PROFILE ADULT - NSPRESCRALCFREQ_GEN_A_NUR
"Occupational Therapy   Treatment    Name: Pilar Michael  MRN: [id number]  Admitting Diagnosis:  Acute on chronic combined systolic and diastolic CHF (congestive heart failure)       Recommendations:     Discharge Recommendations: nursing facility, skilled  Discharge Equipment Recommendations:  none  Barriers to discharge:  Decreased caregiver support    Assessment:     Pilar Michael is a 89 y.o. female with a medical diagnosis of Acute on chronic combined systolic and diastolic CHF (congestive heart failure).  She presents pleasant upon entry to room stating fatigue and agreeing to transfer to chair w/ encouragement from son. Increased time spent on ADLs 2/2 decreased functional mobility and endurance. Performance deficits affecting function are weakness, impaired endurance, impaired self care skills, impaired functional mobility, decreased upper extremity function, edema, impaired balance, gait instability.     Pt not at baseline for ADL and functional mobility performance in addition to concerns of fall risk and would benefit from continued OT services at this time.     Rehab Prognosis:  Good; patient would benefit from acute skilled OT services to address these deficits and reach maximum level of function.       Plan:     Patient to be seen 4 x/week to address the above listed problems via self-care/home management, therapeutic activities, therapeutic exercises  · Plan of Care Expires: 08/19/22  · Plan of Care Reviewed with: patient, son    Subjective     "I get worn out after lunch"    Pain/Comfort:  Pain Rating 1: 0/10    Objective:     Communicated with: RN prior to session.  Patient found supine with oxygen, PureWick, telemetry, peripheral IV upon OT entry to room.    General Precautions: Standard, aspiration, fall   Orthopedic Precautions:N/A   Braces: N/A  Respiratory Status: Nasal cannula, flow 2 L/min     Occupational Performance:     Bed Mobility:    · Patient completed Rolling/Turning to Right with " minimum assistance  · Patient completed Scooting/Bridging with contact guard assistance  · Patient completed Supine to Sit with minimum assistance   · Increased assistance needed for bed mob 2/2 decreased UE strength and LUE edema effecting ability to push from bed.   · Pt sat EOB ~5 min during hygiene w post lean noted    Functional Mobility/Transfers:  · Patient completed Sit <> Stand Transfer with minimum assistance  with  rolling walker   · Patient completed Bed <> Chair Transfer using Step Transfer technique with contact guard assistance with rolling walker  · Functional Mobility: vcs required for RW management. Decreased endurance effecting  func ambulation distance w/ ~4 steps taken to reach chair and expressing fatigue during func mob.    Activities of Daily Living:  · Upper Body Dressing: minimum assistance don gown requiring task initiation 2/2 decreased UE strength and LUE edema  · Lower Body Dressing: total assistance therapist don/doff socks 2/2 generalized weakness and decreased LE function  · Toileting: total assistance x1 therapist don/doff brief 2/2 decreased dynamic standing balance. Therapist performed full toileting and hygeine EOB w/ increased time spent 2/2 pt requiring numerous rest breaks. 2nd therapist acting as tech needed to assist with clothing managment and hygiene to maintain safety.      [facility] 6 Click ADL: 14    Treatment & Education:  Pt educated on scope of practice and importance of daily functional mobility. White board updated to reflect pt status and visual reminder included on board instructing her to call for nurse as needed.    Patient left up in chair with all lines intact, call button in reach, RN notified and son presentEducation:      GOALS:   Multidisciplinary Problems     Occupational Therapy Goals        Problem: Occupational Therapy    Goal Priority Disciplines Outcome Interventions   Occupational Therapy Goal     OT, PT/OT Ongoing, Progressing    Description: Goals to  be met by: 07-31-22     Patient will increase functional independence with ADLs by performing:    UE Dressing with Supervision.  LE Dressing with Minimal Assistance with AE as needed  Grooming while seated with Set-up Assistance.  Toileting from bedside commode with Minimal Assistance for hygiene and clothing management.   Bathing from  shower chair/bench with Minimal Assistance.  Supine to sit with Supervision.  Stand pivot transfers with Supervision with RW  Toilet transfer to bedside commode with Supervision.                       Time Tracking:     OT Date of Treatment: 07/22/22  OT Start Time: 1315  OT Stop Time: 1355  OT Total Time (min): 40 min    Billable Minutes:Self Care/Home Management 40    OT/LAVONNE: OT          7/22/2022     Never

## 2023-03-30 NOTE — ASU PREOP CHECKLIST - ASSESSMENT, HISTORY & PHYSICAL COMPLETED AND ON MEDICAL RECORD
Per Dr. Gardner:    That could be from the catheter, he may discuss with Urology because it could be traumatic         Message sent to patient portal.   done

## 2025-07-16 ENCOUNTER — EMERGENCY (EMERGENCY)
Facility: HOSPITAL | Age: 75
LOS: 0 days | Discharge: HOME | End: 2025-07-16
Attending: EMERGENCY MEDICINE
Payer: MEDICARE

## 2025-07-16 VITALS
DIASTOLIC BLOOD PRESSURE: 69 MMHG | OXYGEN SATURATION: 97 % | WEIGHT: 184.97 LBS | SYSTOLIC BLOOD PRESSURE: 199 MMHG | TEMPERATURE: 98 F | RESPIRATION RATE: 18 BRPM | HEART RATE: 60 BPM

## 2025-07-16 VITALS
DIASTOLIC BLOOD PRESSURE: 79 MMHG | OXYGEN SATURATION: 97 % | TEMPERATURE: 99 F | HEART RATE: 55 BPM | SYSTOLIC BLOOD PRESSURE: 158 MMHG | RESPIRATION RATE: 18 BRPM

## 2025-07-16 DIAGNOSIS — K21.9 GASTRO-ESOPHAGEAL REFLUX DISEASE WITHOUT ESOPHAGITIS: ICD-10-CM

## 2025-07-16 DIAGNOSIS — M25.562 PAIN IN LEFT KNEE: ICD-10-CM

## 2025-07-16 DIAGNOSIS — Z90.49 ACQUIRED ABSENCE OF OTHER SPECIFIED PARTS OF DIGESTIVE TRACT: Chronic | ICD-10-CM

## 2025-07-16 DIAGNOSIS — E11.9 TYPE 2 DIABETES MELLITUS WITHOUT COMPLICATIONS: ICD-10-CM

## 2025-07-16 DIAGNOSIS — E03.9 HYPOTHYROIDISM, UNSPECIFIED: ICD-10-CM
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
## 2025-07-16 DIAGNOSIS — Z98.891 HISTORY OF UTERINE SCAR FROM PREVIOUS SURGERY: Chronic | ICD-10-CM
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
## 2025-07-16 PROCEDURE — 73562 X-RAY EXAM OF KNEE 3: CPT | Mod: LT

## 2025-07-16 PROCEDURE — 73562 X-RAY EXAM OF KNEE 3: CPT | Mod: 26,LT

## 2025-07-16 PROCEDURE — 99284 EMERGENCY DEPT VISIT MOD MDM: CPT | Mod: FS

## 2025-07-16 PROCEDURE — 99283 EMERGENCY DEPT VISIT LOW MDM: CPT | Mod: 25

## 2025-07-16 RX ORDER — MELOXICAM 15 MG/1
1 TABLET ORAL
Qty: 10 | Refills: 0
Start: 2025-07-16 | End: 2025-07-25

## 2025-07-16 NOTE — ED PROVIDER NOTE - CLINICAL SUMMARY MEDICAL DECISION MAKING FREE TEXT BOX
75-year-old female past medical history of hypothyroid GERD presents with 1 week of worsening left knee pain worse with movement and ambulation.  No trauma.  Patient states 4 months ago she tripped and fell on steps outside her home and injured her left knee but has not bothered her since.  No swelling.  No fever.  No numbness weakness.  No other injury sustained.  Taking Aleve and Tylenol as needed for pain.    On exam, AFVSS, Well appearing, No acute distress, NCAT, EOMI, PERRLA, MMM, Neck supple, left knee mild tenderness at medial anterior aspect, no effusion, no erythema or warmth, skin intact, full range of motion 5 out of 5 motor no stiffness 2+ PT pulses negative anterior posterior drawer sign no laxity with varus and valgus pressure, normal gait, AAOx3, No Focal Deficits, No LE edema or calf TTP,    A/P; atraumatic knee pain neurovascular intact no sign of infection x-ray no fracture or dislocation DC home with NSAIDs as needed ice elevate Ortho follow-up as outpatient 1 to 2 weeks, strict return precautions

## 2025-07-16 NOTE — ED PROVIDER NOTE - PATIENT PORTAL LINK FT
You can access the FollowMyHealth Patient Portal offered by [facility] by registering at the following website: [email or web address]. By joining [facility]’s FollowMyHealth portal, you will also be able to view your health information using other applications (apps) compatible with our system.

## 2025-07-16 NOTE — ED ADULT NURSE NOTE - CAS TRG GEN SKIN CONDITION
Warm I personally performed the service described in the documentation recorded by the scribe in my presence, and it accurately and completely records my words and actions.

## 2025-07-16 NOTE — ED PROVIDER NOTE - PHYSICAL EXAMINATION
CONST: Well appearing in NAD  MS: Normal ROM in all extremities. No midline spinal tenderness. Left knee w/o effusion or redness. FROM. No laxity. No pop fullness. Pulses intact distally. Superficial varicosities notes  SKIN: Warm, dry, no acute rashes. Good turgor  NEURO: A&Ox3, No focal deficits. Strength 5/5 with no sensory deficits. Steady gait

## 2025-07-16 NOTE — ED PROVIDER NOTE - OBJECTIVE STATEMENT
pt with pre DM presents with atraumatic left knee pain intermittently x few weeks but persistent the last few days. No relief with tylenol or NSAIDS. Denies fever, chills, CP, SOB. No previous issues with the knee

## 2025-07-16 NOTE — ED PROVIDER NOTE - CARE PROVIDER_API CALL
Kennedy Gill)  Orthopaedic Sports Medicine  [address] [place] [address]  Phone: [phone]  Fax: [phone]  Follow Up Time: 4-6 Days

## 2025-07-16 NOTE — ED ADULT NURSE NOTE - PAIN: PRESENCE, MLM
PAST MEDICAL HISTORY:  H/O gastroesophageal reflux (GERD)     HTN (hypertension)     Knee tumor     Mass of right knee     Prediabetes     
complains of pain/discomfort

## 2025-07-16 NOTE — ED ADULT NURSE NOTE - OBJECTIVE STATEMENT
pt presented to ED c/o L knee x1 week. denies any recent falls or traumas. pt ambulatory in ED but endorses discomfort. denies any recent otc pain relief.

## 2025-07-16 NOTE — ED ADULT NURSE NOTE - NSFALLUNIVINTERV_ED_ALL_ED
Bed/Stretcher in lowest position, wheels locked, appropriate side rails in place/Call bell, personal items and telephone in reach/Instruct patient to call for assistance before getting out of bed/chair/stretcher/Non-slip footwear applied when patient is off stretcher/[place] to call system/Physically safe environment - no spills, clutter or unnecessary equipment/Purposeful proactive rounding/Room/bathroom lighting operational, light cord in reach

## 2025-07-16 NOTE — ED ADULT TRIAGE NOTE - CHIEF COMPLAINT QUOTE
Pt c/o L knee pain x 1 week. Denies fall or injury. Pain exacerbated when walking. Pt ambulatory in triage